# Patient Record
Sex: FEMALE | Race: OTHER | Employment: OTHER | URBAN - METROPOLITAN AREA
[De-identification: names, ages, dates, MRNs, and addresses within clinical notes are randomized per-mention and may not be internally consistent; named-entity substitution may affect disease eponyms.]

---

## 2018-04-02 ENCOUNTER — HOSPITAL ENCOUNTER (EMERGENCY)
Facility: HOSPITAL | Age: 48
Discharge: HOME | End: 2018-04-02
Attending: PHYSICIAN ASSISTANT
Payer: COMMERCIAL

## 2018-04-02 VITALS
OXYGEN SATURATION: 100 % | DIASTOLIC BLOOD PRESSURE: 70 MMHG | RESPIRATION RATE: 16 BRPM | BODY MASS INDEX: 33.96 KG/M2 | HEART RATE: 72 BPM | HEIGHT: 60 IN | SYSTOLIC BLOOD PRESSURE: 138 MMHG | TEMPERATURE: 98 F | WEIGHT: 173 LBS

## 2018-04-02 DIAGNOSIS — B02.9 HERPES ZOSTER WITHOUT COMPLICATION: Primary | ICD-10-CM

## 2018-04-02 DIAGNOSIS — N39.0 URINARY TRACT INFECTION WITHOUT HEMATURIA, SITE UNSPECIFIED: ICD-10-CM

## 2018-04-02 DIAGNOSIS — R10.13 ABDOMINAL PAIN, EPIGASTRIC: ICD-10-CM

## 2018-04-02 PROCEDURE — 36415 COLL VENOUS BLD VENIPUNCTURE: CPT

## 2018-04-02 PROCEDURE — 87086 URINE CULTURE/COLONY COUNT: CPT | Performed by: PHYSICIAN ASSISTANT

## 2018-04-02 PROCEDURE — 85025 COMPLETE CBC W/AUTO DIFF WBC: CPT | Performed by: PHYSICIAN ASSISTANT

## 2018-04-02 PROCEDURE — 81025 URINE PREGNANCY TEST: CPT

## 2018-04-02 PROCEDURE — 99283 EMERGENCY DEPT VISIT LOW MDM: CPT

## 2018-04-02 PROCEDURE — 80048 BASIC METABOLIC PNL TOTAL CA: CPT | Performed by: PHYSICIAN ASSISTANT

## 2018-04-02 PROCEDURE — 81001 URINALYSIS AUTO W/SCOPE: CPT | Performed by: PHYSICIAN ASSISTANT

## 2018-04-02 PROCEDURE — 83690 ASSAY OF LIPASE: CPT | Performed by: PHYSICIAN ASSISTANT

## 2018-04-02 PROCEDURE — 80076 HEPATIC FUNCTION PANEL: CPT | Performed by: PHYSICIAN ASSISTANT

## 2018-04-02 RX ORDER — DIPHENHYDRAMINE HCL 25 MG
25 CAPSULE ORAL EVERY 6 HOURS PRN
Qty: 20 CAPSULE | Refills: 0 | Status: SHIPPED | OUTPATIENT
Start: 2018-04-02 | End: 2018-04-07

## 2018-04-02 RX ORDER — DICYCLOMINE HCL 20 MG
20 TABLET ORAL 4 TIMES DAILY PRN
Qty: 30 TABLET | Refills: 0 | Status: SHIPPED | OUTPATIENT
Start: 2018-04-02 | End: 2018-05-02

## 2018-04-02 RX ORDER — FAMOTIDINE 20 MG/1
20 TABLET ORAL 2 TIMES DAILY
Qty: 28 TABLET | Refills: 0 | Status: SHIPPED | OUTPATIENT
Start: 2018-04-02 | End: 2018-04-16

## 2018-04-02 RX ORDER — VALACYCLOVIR HYDROCHLORIDE 1 G/1
1 TABLET, FILM COATED ORAL 3 TIMES DAILY
Qty: 21 TABLET | Refills: 0 | Status: SHIPPED | OUTPATIENT
Start: 2018-04-02 | End: 2018-04-09

## 2018-04-02 RX ORDER — CEPHALEXIN 500 MG/1
500 CAPSULE ORAL 3 TIMES DAILY
Qty: 21 CAPSULE | Refills: 0 | Status: SHIPPED | OUTPATIENT
Start: 2018-04-02 | End: 2018-04-09

## 2018-04-02 RX ORDER — DIAPER,BRIEF,INFANT-TODD,DISP
EACH MISCELLANEOUS 2 TIMES DAILY PRN
COMMUNITY

## 2018-04-02 RX ORDER — DIPHENHYDRAMINE HCL 25 MG
25 CAPSULE ORAL EVERY 6 HOURS PRN
Qty: 20 CAPSULE | Refills: 0 | Status: SHIPPED | OUTPATIENT
Start: 2018-04-02 | End: 2018-04-02

## 2018-04-02 NOTE — ED NOTES
Pt states congested nasally for ten days and having diarrhea x 10 days. Denies fevers. No distress. Lungs clear bilaterally. Denies cp.

## 2018-04-02 NOTE — ED NOTES
Pt with spot of small red raised rash to mid right chest since yesterday- states spot is itchy but so is \"my whole body. \" Pt also states epigastric pain since 0300 this morning. Denies v/d, + nausea.  Pt currently resting on ED cart on cell phone in no di

## 2018-04-03 NOTE — ED PROVIDER NOTES
Patient Seen in: [facility] AND [facility] Emergency Department    History   Patient presents with:  Rash Skin Problem (integumentary)  Allergic Rxn Allergies (immune)  Abdomen/Flank Pain (GI/)  Dizziness (neurologic)    Stated Complaint: Itching to body and b % Transdermal Gel,  Apply to affected area prn pain       No family history on file.     Smoking status: Never Smoker                                                              Smokeless tobacco: Never Used                          Review of Systems    Po tenderness. Genitourinary: Not examined. Lymphatic: No gross lymphadenopathy noted. Musculoskeletal: Musculoskeletal system is grossly intact. There is no obvious deformity. Neurological: Gross motor movement is intact in all 4 extremities.   Patient e without complication  (primary encounter diagnosis)  Abdominal pain, epigastric  Urinary tract infection without hematuria, site unspecified    Disposition:  Discharge    Follow-up:  Shannon Mckinnon [address]  [address] [place] [facility] [address] [phone]    Sc

## 2019-04-15 ENCOUNTER — OFFICE VISIT (OUTPATIENT)
Dept: SURGERY | Facility: CLINIC | Age: 49
End: 2019-04-15
Payer: COMMERCIAL

## 2019-04-15 VITALS
RESPIRATION RATE: 18 BRPM | SYSTOLIC BLOOD PRESSURE: 129 MMHG | WEIGHT: 199.81 LBS | OXYGEN SATURATION: 100 % | DIASTOLIC BLOOD PRESSURE: 76 MMHG | BODY MASS INDEX: 40.28 KG/M2 | HEIGHT: 59 IN | HEART RATE: 76 BPM

## 2019-04-15 DIAGNOSIS — R63.5 WEIGHT GAIN: ICD-10-CM

## 2019-04-15 DIAGNOSIS — E53.8 LOW SERUM VITAMIN B12: ICD-10-CM

## 2019-04-15 DIAGNOSIS — E66.01 MORBID OBESITY WITH BMI OF 40.0-44.9, ADULT (HCC): ICD-10-CM

## 2019-04-15 DIAGNOSIS — R73.09 ABNORMAL BLOOD SUGAR: ICD-10-CM

## 2019-04-15 DIAGNOSIS — E55.9 VITAMIN D DEFICIENCY: ICD-10-CM

## 2019-04-15 DIAGNOSIS — R53.82 CHRONIC FATIGUE: Primary | ICD-10-CM

## 2019-04-15 DIAGNOSIS — R06.83 SNORING: ICD-10-CM

## 2019-04-15 DIAGNOSIS — R06.00 DOE (DYSPNEA ON EXERTION): ICD-10-CM

## 2019-04-15 PROBLEM — E66.9 OBESITY (BMI 30-39.9): Status: ACTIVE | Noted: 2019-04-15

## 2019-04-15 PROBLEM — R06.09 DOE (DYSPNEA ON EXERTION): Status: ACTIVE | Noted: 2019-04-15

## 2019-04-15 PROCEDURE — 99204 OFFICE O/P NEW MOD 45 MIN: CPT | Performed by: INTERNAL MEDICINE

## 2019-04-15 NOTE — PROGRESS NOTES
[facility] Weight [facility] Consultation Note       Date of Consult:  4/15/2019    Patient:  Magaly Robles  :      1970  MRN:      [id number]    Referring Provider: Dr. Carlton ref.  provider found       Chief Complaint:  Patient presents with: Not on file        Inability: Not on file      Transportation needs:        Medical: Not on file        Non-medical: Not on file    Tobacco Use      Smoking status: Never Smoker      Smokeless tobacco: Never Used    Substance and Sexual Activity      Alcoh Drink 64oz of water per day, Maintain a daily food journal, No drinking 30 minutes before or after meals, Utilize portion control strategies to reduce calorie intake, Identify triggers for eating and manage cues and Eat slowly and take 20 to 30 minutes to activity-upper body exercises, walk 10 minutes per day. 4. Increase fruit and vegetable servings to 5-6 per day.       Needs to attend seminar    Needs support from family    Needs to cut back on sugar    Refer to RD      Diagnoses and all orders for this

## 2019-04-24 ENCOUNTER — TELEPHONE (OUTPATIENT)
Dept: SURGERY | Facility: CLINIC | Age: 49
End: 2019-04-24

## 2019-04-24 NOTE — TELEPHONE ENCOUNTER
4/19/19 @ 9:24am Spoke to Antonio at [facility], [phone], Ref#[id number]. She verified that patient has following benefits for Bariatric services:   • No weight management criteria. • No AGUSTINA/Blue Distinction required.    • KERI (BZQ#[id number]) DX E66.01 &

## 2019-04-24 NOTE — TELEPHONE ENCOUNTER
Per the request of Dr. Mary Seay, called and spoke to patient to review all insurance information received regarding Bariatric Program.

## 2019-06-17 ENCOUNTER — OFFICE VISIT (OUTPATIENT)
Dept: SURGERY | Facility: CLINIC | Age: 49
End: 2019-06-17
Payer: COMMERCIAL

## 2019-06-17 VITALS
BODY MASS INDEX: 38.71 KG/M2 | OXYGEN SATURATION: 99 % | WEIGHT: 192 LBS | SYSTOLIC BLOOD PRESSURE: 126 MMHG | HEART RATE: 73 BPM | DIASTOLIC BLOOD PRESSURE: 75 MMHG | HEIGHT: 59 IN | RESPIRATION RATE: 16 BRPM

## 2019-06-17 DIAGNOSIS — R53.82 CHRONIC FATIGUE: Primary | ICD-10-CM

## 2019-06-17 DIAGNOSIS — R06.83 SNORING: ICD-10-CM

## 2019-06-17 DIAGNOSIS — E55.9 VITAMIN D DEFICIENCY: ICD-10-CM

## 2019-06-17 DIAGNOSIS — E66.9 OBESITY (BMI 30-39.9): ICD-10-CM

## 2019-06-17 DIAGNOSIS — Z71.3 PRE-BARIATRIC SURGERY NUTRITION EVALUATION: ICD-10-CM

## 2019-06-17 DIAGNOSIS — R73.09 ABNORMAL BLOOD SUGAR: ICD-10-CM

## 2019-06-17 PROCEDURE — 99214 OFFICE O/P EST MOD 30 MIN: CPT | Performed by: INTERNAL MEDICINE

## 2019-06-17 RX ORDER — PHENTERMINE HYDROCHLORIDE 15 MG/1
15 CAPSULE ORAL EVERY MORNING
Qty: 30 CAPSULE | Refills: 2 | Status: SHIPPED | OUTPATIENT
Start: 2019-06-17 | End: 2021-05-10 | Stop reason: DRUGHIGH

## 2019-06-17 NOTE — PROGRESS NOTES
[address], [place] [address] Amy Zelaya  [facility] [address] [facility] [address]  Dept: [phone]       Patient:  Ed Lelo Sam  :      1970  MRN:      [id number]    Chief Complaint:  Patient pres Alcohol use: Not on file      Drug use: Not on file      Sexual activity: Not on file    Lifestyle      Physical activity:        Days per week: Not on file        Minutes per session: Not on file      Stress: Not on file    Relationships      Social conne minutes before or after meals, Utlize portion control strategies to reduce calorie intake, Identify triggers for eating and manage cues and Eat slowly and take 20 to 30 minutes to complete each meal    Exercise Goals Reviewed and Discussed    Keep walking beverages per day. No fruit juices or regular soda. 3. Increase activity-upper body exercises, walk 10 minutes per day. 4. Increase fruit and vegetable servings to 5-6 per day.       Has support from daughter    Needs to come to seminar    Aware she needs METABOLIC PANEL (14); Future  -     VITAMIN B1 (THIAMINE), BLOOD; Future  -     VITAMIN D, 25-HYDROXY; Future  -     VITAMIN B12; Future  -     MAGNESIUM;  Future  -     PHOSPHORUS; Future  -     IRON, SERUM; Future  -     FERRITIN; Future  -     LIPID PANE

## 2019-07-20 ENCOUNTER — LAB ENCOUNTER (OUTPATIENT)
Dept: LAB | Facility: HOSPITAL | Age: 49
End: 2019-07-20
Attending: INTERNAL MEDICINE
Payer: COMMERCIAL

## 2019-07-20 DIAGNOSIS — Z71.3 PRE-BARIATRIC SURGERY NUTRITION EVALUATION: ICD-10-CM

## 2019-07-20 DIAGNOSIS — R06.83 SNORING: ICD-10-CM

## 2019-07-20 DIAGNOSIS — E66.9 OBESITY (BMI 30-39.9): ICD-10-CM

## 2019-07-20 DIAGNOSIS — R63.5 WEIGHT GAIN: ICD-10-CM

## 2019-07-20 DIAGNOSIS — R73.09 ABNORMAL BLOOD SUGAR: ICD-10-CM

## 2019-07-20 DIAGNOSIS — E66.01 MORBID OBESITY WITH BMI OF 40.0-44.9, ADULT (HCC): ICD-10-CM

## 2019-07-20 DIAGNOSIS — E53.8 LOW SERUM VITAMIN B12: ICD-10-CM

## 2019-07-20 DIAGNOSIS — E55.9 VITAMIN D DEFICIENCY: ICD-10-CM

## 2019-07-20 DIAGNOSIS — R53.82 CHRONIC FATIGUE: ICD-10-CM

## 2019-07-20 DIAGNOSIS — R06.00 DOE (DYSPNEA ON EXERTION): ICD-10-CM

## 2019-07-20 LAB
ALBUMIN SERPL-MCNC: 3.7 G/DL (ref 3.4–5)
ALBUMIN/GLOB SERPL: 1 {RATIO} (ref 1–2)
ALP LIVER SERPL-CCNC: 70 U/L (ref 39–100)
ALT SERPL-CCNC: 21 U/L (ref 13–56)
ANION GAP SERPL CALC-SCNC: 5 MMOL/L (ref 0–18)
AST SERPL-CCNC: 16 U/L (ref 15–37)
BILIRUB SERPL-MCNC: 0.3 MG/DL (ref 0.1–2)
BUN BLD-MCNC: 11 MG/DL (ref 7–18)
BUN/CREAT SERPL: 15.3 (ref 10–20)
CALCIUM BLD-MCNC: 8.4 MG/DL (ref 8.5–10.1)
CHLORIDE SERPL-SCNC: 108 MMOL/L (ref 98–112)
CHOLEST SMN-MCNC: 133 MG/DL (ref ?–200)
CO2 SERPL-SCNC: 27 MMOL/L (ref 21–32)
CREAT BLD-MCNC: 0.72 MG/DL (ref 0.55–1.02)
DEPRECATED HBV CORE AB SER IA-ACNC: 7.2 NG/ML (ref 12–240)
DEPRECATED RDW RBC AUTO: 42.2 FL (ref 35.1–46.3)
ERYTHROCYTE [DISTWIDTH] IN BLOOD BY AUTOMATED COUNT: 13.1 % (ref 11–15)
EST. AVERAGE GLUCOSE BLD GHB EST-MCNC: 111 MG/DL (ref 68–126)
FOLATE SERPL-MCNC: 14.1 NG/ML (ref 8.7–?)
GLOBULIN PLAS-MCNC: 3.8 G/DL (ref 2.8–4.4)
GLUCOSE BLD-MCNC: 97 MG/DL (ref 70–99)
HAV IGM SER QL: 2.2 MG/DL (ref 1.6–2.6)
HBA1C MFR BLD HPLC: 5.5 % (ref ?–5.7)
HCT VFR BLD AUTO: 35.8 % (ref 35–48)
HDLC SERPL-MCNC: 47 MG/DL (ref 40–59)
HGB BLD-MCNC: 11.4 G/DL (ref 12–16)
IRON SERPL-MCNC: 48 UG/DL (ref 50–170)
LDLC SERPL CALC-MCNC: 71 MG/DL (ref ?–100)
M PROTEIN MFR SERPL ELPH: 7.5 G/DL (ref 6.4–8.2)
MCH RBC QN AUTO: 28 PG (ref 26–34)
MCHC RBC AUTO-ENTMCNC: 31.8 G/DL (ref 31–37)
MCV RBC AUTO: 88 FL (ref 80–100)
NONHDLC SERPL-MCNC: 86 MG/DL (ref ?–130)
OSMOLALITY SERPL CALC.SUM OF ELEC: 289 MOSM/KG (ref 275–295)
PATIENT FASTING: YES
PATIENT FASTING: YES
PHOSPHATE SERPL-MCNC: 3.4 MG/DL (ref 2.5–4.9)
PLATELET # BLD AUTO: 226 10(3)UL (ref 150–450)
POTASSIUM SERPL-SCNC: 4.4 MMOL/L (ref 3.5–5.1)
RBC # BLD AUTO: 4.07 X10(6)UL (ref 3.8–5.3)
SODIUM SERPL-SCNC: 140 MMOL/L (ref 136–145)
TRIGL SERPL-MCNC: 76 MG/DL (ref 30–149)
TSI SER-ACNC: 1.65 MIU/ML (ref 0.36–3.74)
VIT B12 SERPL-MCNC: 521 PG/ML (ref 193–986)
VLDLC SERPL CALC-MCNC: 15 MG/DL (ref 0–30)
WBC # BLD AUTO: 5.3 X10(3) UL (ref 4–11)

## 2019-07-20 PROCEDURE — 84443 ASSAY THYROID STIM HORMONE: CPT

## 2019-07-20 PROCEDURE — 82306 VITAMIN D 25 HYDROXY: CPT

## 2019-07-20 PROCEDURE — 83735 ASSAY OF MAGNESIUM: CPT

## 2019-07-20 PROCEDURE — 80053 COMPREHEN METABOLIC PANEL: CPT

## 2019-07-20 PROCEDURE — 80061 LIPID PANEL: CPT

## 2019-07-20 PROCEDURE — 82397 CHEMILUMINESCENT ASSAY: CPT

## 2019-07-20 PROCEDURE — 83036 HEMOGLOBIN GLYCOSYLATED A1C: CPT

## 2019-07-20 PROCEDURE — 82607 VITAMIN B-12: CPT

## 2019-07-20 PROCEDURE — 83540 ASSAY OF IRON: CPT

## 2019-07-20 PROCEDURE — 82728 ASSAY OF FERRITIN: CPT

## 2019-07-20 PROCEDURE — 82746 ASSAY OF FOLIC ACID SERUM: CPT

## 2019-07-20 PROCEDURE — 85027 COMPLETE CBC AUTOMATED: CPT

## 2019-07-20 PROCEDURE — 84425 ASSAY OF VITAMIN B-1: CPT

## 2019-07-20 PROCEDURE — 36415 COLL VENOUS BLD VENIPUNCTURE: CPT

## 2019-07-20 PROCEDURE — 84100 ASSAY OF PHOSPHORUS: CPT

## 2019-07-22 LAB
25(OH)D3 SERPL-MCNC: 16.4 NG/ML (ref 30–100)
LEPTIN: 28.2 NG/ML

## 2019-07-22 RX ORDER — ERGOCALCIFEROL (VITAMIN D2) 1250 MCG
50000 CAPSULE ORAL WEEKLY
Qty: 12 CAPSULE | Refills: 0 | Status: SHIPPED | OUTPATIENT
Start: 2019-07-22 | End: 2019-10-08

## 2019-07-24 LAB — VITAMIN B1 (THIAMINE), WHOLE B: 108 NMOL/L

## 2019-07-25 ENCOUNTER — TELEPHONE (OUTPATIENT)
Dept: SURGERY | Facility: CLINIC | Age: 49
End: 2019-07-25

## 2019-07-30 ENCOUNTER — TELEPHONE (OUTPATIENT)
Dept: SURGERY | Facility: CLINIC | Age: 49
End: 2019-07-30

## 2019-08-05 ENCOUNTER — TELEPHONE (OUTPATIENT)
Dept: SURGERY | Facility: CLINIC | Age: 49
End: 2019-08-05

## 2020-11-09 ENCOUNTER — OFFICE VISIT (OUTPATIENT)
Dept: FAMILY MEDICINE CLINIC | Facility: CLINIC | Age: 50
End: 2020-11-09
Payer: COMMERCIAL

## 2020-11-09 VITALS
BODY MASS INDEX: 37.69 KG/M2 | WEIGHT: 192 LBS | TEMPERATURE: 98 F | HEART RATE: 77 BPM | OXYGEN SATURATION: 100 % | DIASTOLIC BLOOD PRESSURE: 64 MMHG | SYSTOLIC BLOOD PRESSURE: 118 MMHG | HEIGHT: 60 IN

## 2020-11-09 DIAGNOSIS — Z20.822 EXPOSURE TO COVID-19 VIRUS: Primary | ICD-10-CM

## 2020-11-09 DIAGNOSIS — R05.9 COUGH: ICD-10-CM

## 2020-11-09 PROCEDURE — 99202 OFFICE O/P NEW SF 15 MIN: CPT | Performed by: NURSE PRACTITIONER

## 2020-11-09 PROCEDURE — 3008F BODY MASS INDEX DOCD: CPT | Performed by: NURSE PRACTITIONER

## 2020-11-09 PROCEDURE — 3078F DIAST BP <80 MM HG: CPT | Performed by: NURSE PRACTITIONER

## 2020-11-09 PROCEDURE — 99072 ADDL SUPL MATRL&STAF TM PHE: CPT | Performed by: NURSE PRACTITIONER

## 2020-11-09 PROCEDURE — 3074F SYST BP LT 130 MM HG: CPT | Performed by: NURSE PRACTITIONER

## 2020-11-09 NOTE — PATIENT INSTRUCTIONS
Enfermedad del coronavirus 2019 (COVID-19): ¿cómo cuidarse y cuidar a otros? Si usted o algún miembro del núcleo familiar tiene síntomas de la COVID-19, siga las siguientes pautas para prevenir la propagación del virus y para controlar los síntomas.    ¿ · Si tiene que ir a elroy clínica o a un hospital, tenga en cuenta que el personal de atención médica podría usar equipos de protección, john barbijos, trajes, guantes y protección ocular. Es posible que lo lleven a elroy chseter aparte.  Eso es para evitar que el · Evite el contacto con las mascotas y con otros [place]. · No comparta comida ni artículos personales con gente de perkins entorno. Eso incluye elementos john utensilios para comer y beber, toallas y louisaa de [place].   · Si necesita toser o estornudar, hágalo en Si se confirma que tiene la COVID-19, perkins equipo de atención médica puede pedirle que considere donar plasma. [place] se denomina donación de plasma de convaleciente de COVID-19.  El plasma de las personas recuperadas por completo de la COVID-19 puede contener Ashley restricciones son diferentes si tuvo COVID-19 en los últimos 3 meses, maverick está completamente recuperado, sin síntomas, y estuvo expuesto a elroy persona con COVID-19.  Si no presenta síntomas, no tiene que quedarse en casa asilado de otras personas ni vo Si tiene un sistema inmunitario debilitado y tiene COVID-19, o si tuvo un cuadro grave de COVID-19, las instrucciones sobre cuándo suspender el aislamiento serán algo diferentes.  Algunas afecciones y tratamientos pueden [employer], co · El uso de barbflakita puede ayudar a [employer] con COVID-19 propaguen el virus a otros.   · Mediante el uso de barbijos, hay elroy mayor probabilidad de reducir la propagación de la COVID-19 si la mayoría de las personas los utiliza en público.

## 2020-11-09 NOTE — PROGRESS NOTES
CHIEF COMPLAINT:   Patient presents with:  Covid: Symptoms x1 week      HPI:   Phillip Abad is a 52year old female who presents for cough for  1 week. Patient reports cough (mild). Symptoms have been improving since onset.   Treating symptoms with: GENERAL: Non-toxic, well developed, well nourished, and in no apparent distress  SKIN: no rashes, no suspicious lesions  HEAD: atraumatic, normocephalic. No tenderness on palpation of maxillary or frontal sinuses.   EYES: conjunctiva clear, EOM intact  EAR · Quédese en perkins casa. Llame a perkins proveedor de [employer] y dígale que tiene los síntomas de la COVID-19. Charly esto antes de ir al hospital o a la clínica. Siga las instrucciones de perkins proveedor. Es posible que le aconsejen que se aísle en perkins casa.  A e · Informe al personal de atención médica sobre delio viajes recientes. [place] incluye los viajes locales en transporte público. Henrene Damian el personal médico necesite averiguar acerca de otras personas con las que usted haya tenido contacto.   · [address] · Si necesita toser o estornudar, hágalo en un pañuelo desechable. Luego arroje el pañuelo desechable a la basura. Si no tiene un pañuelo, tosa o estornude en el pliegue del codo. · Lávese las fitz con frecuencia.     Cuidados personales en perkins casa   Actu Si se confirma que tiene la COVID-19, perkins equipo de atención médica puede pedirle que considere donar plasma. [place] se denomina donación de plasma de convaleciente de COVID-19.  El plasma de las personas recuperadas por completo de la COVID-19 puede contener Ashley restricciones son diferentes si tuvo COVID-19 en los últimos 3 meses, maverick está completamente recuperado, sin síntomas, y estuvo expuesto a elroy persona con COVID-19.  Si no presenta síntomas, no tiene que quedarse en casa asilado de otras personas ni vo Si tiene un sistema inmunitario debilitado y tiene COVID-19, o si tuvo un cuadro grave de COVID-19, las instrucciones sobre cuándo suspender el aislamiento serán algo diferentes.  Algunas afecciones y tratamientos pueden [employer], co · El uso de barbflakita puede ayudar a [employer] con COVID-19 propaguen el virus a otros.   · Mediante el uso de barbijos, hay elroy mayor probabilidad de reducir la propagación de la COVID-19 si la mayoría de las personas los utiliza en público.

## 2021-05-10 ENCOUNTER — EKG ENCOUNTER (OUTPATIENT)
Dept: LAB | Facility: HOSPITAL | Age: 51
End: 2021-05-10
Attending: INTERNAL MEDICINE
Payer: COMMERCIAL

## 2021-05-10 ENCOUNTER — OFFICE VISIT (OUTPATIENT)
Dept: SURGERY | Facility: CLINIC | Age: 51
End: 2021-05-10
Payer: COMMERCIAL

## 2021-05-10 VITALS
SYSTOLIC BLOOD PRESSURE: 142 MMHG | OXYGEN SATURATION: 100 % | DIASTOLIC BLOOD PRESSURE: 80 MMHG | HEART RATE: 68 BPM | BODY MASS INDEX: 39.72 KG/M2 | WEIGHT: 197 LBS | HEIGHT: 59 IN

## 2021-05-10 DIAGNOSIS — E66.9 OBESITY (BMI 30-39.9): ICD-10-CM

## 2021-05-10 DIAGNOSIS — R53.82 CHRONIC FATIGUE: Primary | ICD-10-CM

## 2021-05-10 DIAGNOSIS — Z51.81 ENCOUNTER FOR THERAPEUTIC DRUG MONITORING: ICD-10-CM

## 2021-05-10 DIAGNOSIS — R53.82 CHRONIC FATIGUE: ICD-10-CM

## 2021-05-10 PROCEDURE — 93010 ELECTROCARDIOGRAM REPORT: CPT | Performed by: INTERNAL MEDICINE

## 2021-05-10 PROCEDURE — 3079F DIAST BP 80-89 MM HG: CPT | Performed by: INTERNAL MEDICINE

## 2021-05-10 PROCEDURE — 93005 ELECTROCARDIOGRAM TRACING: CPT

## 2021-05-10 PROCEDURE — 3008F BODY MASS INDEX DOCD: CPT | Performed by: INTERNAL MEDICINE

## 2021-05-10 PROCEDURE — 99214 OFFICE O/P EST MOD 30 MIN: CPT | Performed by: INTERNAL MEDICINE

## 2021-05-10 PROCEDURE — 3077F SYST BP >= 140 MM HG: CPT | Performed by: INTERNAL MEDICINE

## 2021-05-10 RX ORDER — PHENTERMINE HYDROCHLORIDE 15 MG/1
15 CAPSULE ORAL EVERY MORNING
Qty: 30 CAPSULE | Refills: 2 | Status: SHIPPED | OUTPATIENT
Start: 2021-05-10 | End: 2022-02-01

## 2021-05-10 NOTE — PROGRESS NOTES
[address] [facility] [address]  Dept: [phone]       Patient:  Susi Kjdemetrio Vargas  :      1970  MRN:      [id number]    Chief Complaint:  Patient pres Substance and Sexual Activity      Alcohol use: Not on file      Drug use: Not on file      Sexual activity: Not on file    Other Topics      Concerns:        Not on file    Social History Narrative      Not on file    Social Determinants of [address] [facility] within 1 hour of waking  and Eat 3-4 cups of fresh fruits or vegetables daily    Behavior Modifications Reviewed and Discussed  Plan meals in advance, Read nutrition labels, Drink 64 oz of water per day, Maintain a daily food journal, No drinking 30 minute consideration for obtaining the sleep study will be discussed with the patient's PCP. Goals for next month:  1. Keep a food log. 2. Drink 48-64 ounces of non-caloric beverages per day. No fruit juices or regular soda.   3. Increase activity-upper body e

## 2021-05-11 ENCOUNTER — HOSPITAL ENCOUNTER (OUTPATIENT)
Dept: MAMMOGRAPHY | Facility: HOSPITAL | Age: 51
Discharge: HOME | End: 2021-05-11
Attending: FAMILY MEDICINE
Payer: COMMERCIAL

## 2021-05-11 DIAGNOSIS — Z12.31 ENCOUNTER FOR SCREENING MAMMOGRAM FOR MALIGNANT NEOPLASM OF BREAST: ICD-10-CM

## 2021-05-11 PROCEDURE — 77067 SCR MAMMO BI INCL CAD: CPT | Performed by: FAMILY MEDICINE

## 2021-05-11 PROCEDURE — 77063 BREAST TOMOSYNTHESIS BI: CPT | Performed by: FAMILY MEDICINE

## 2022-02-01 ENCOUNTER — OFFICE VISIT (OUTPATIENT)
Dept: SURGERY | Facility: CLINIC | Age: 52
End: 2022-02-01
Payer: COMMERCIAL

## 2022-02-01 VITALS
WEIGHT: 212.63 LBS | HEART RATE: 76 BPM | BODY MASS INDEX: 42.87 KG/M2 | SYSTOLIC BLOOD PRESSURE: 131 MMHG | DIASTOLIC BLOOD PRESSURE: 75 MMHG | HEIGHT: 59 IN | OXYGEN SATURATION: 100 %

## 2022-02-01 DIAGNOSIS — Z51.81 ENCOUNTER FOR THERAPEUTIC DRUG MONITORING: ICD-10-CM

## 2022-02-01 DIAGNOSIS — E66.01 MORBID OBESITY WITH BMI OF 40.0-44.9, ADULT (HCC): ICD-10-CM

## 2022-02-01 DIAGNOSIS — R53.82 CHRONIC FATIGUE: Primary | ICD-10-CM

## 2022-02-01 PROCEDURE — 3078F DIAST BP <80 MM HG: CPT | Performed by: INTERNAL MEDICINE

## 2022-02-01 PROCEDURE — 99214 OFFICE O/P EST MOD 30 MIN: CPT | Performed by: INTERNAL MEDICINE

## 2022-02-01 PROCEDURE — 3075F SYST BP GE 130 - 139MM HG: CPT | Performed by: INTERNAL MEDICINE

## 2022-02-01 PROCEDURE — 3008F BODY MASS INDEX DOCD: CPT | Performed by: INTERNAL MEDICINE

## 2022-02-15 ENCOUNTER — OFFICE VISIT (OUTPATIENT)
Dept: SURGERY | Facility: CLINIC | Age: 52
End: 2022-02-15
Payer: COMMERCIAL

## 2022-02-15 VITALS — BODY MASS INDEX: 38.83 KG/M2 | HEIGHT: 62 IN | WEIGHT: 211 LBS

## 2022-02-15 DIAGNOSIS — G47.33 OSA (OBSTRUCTIVE SLEEP APNEA): Primary | ICD-10-CM

## 2022-02-15 DIAGNOSIS — E66.01 CLASS 2 SEVERE OBESITY WITH SERIOUS COMORBIDITY AND BODY MASS INDEX (BMI) OF 38.0 TO 38.9 IN ADULT, UNSPECIFIED OBESITY TYPE (HCC): ICD-10-CM

## 2022-02-15 PROCEDURE — 3008F BODY MASS INDEX DOCD: CPT

## 2022-02-15 PROCEDURE — 97802 MEDICAL NUTRITION INDIV IN: CPT

## 2023-11-06 ENCOUNTER — APPOINTMENT (OUTPATIENT)
Dept: GENERAL RADIOLOGY | Facility: HOSPITAL | Age: 53
End: 2023-11-06
Attending: EMERGENCY MEDICINE
Payer: COMMERCIAL

## 2023-11-06 ENCOUNTER — HOSPITAL ENCOUNTER (EMERGENCY)
Facility: HOSPITAL | Age: 53
Discharge: HOME | End: 2023-11-06
Attending: EMERGENCY MEDICINE
Payer: COMMERCIAL

## 2023-11-06 VITALS
WEIGHT: 223 LBS | BODY MASS INDEX: 41 KG/M2 | SYSTOLIC BLOOD PRESSURE: 126 MMHG | HEART RATE: 66 BPM | OXYGEN SATURATION: 98 % | TEMPERATURE: 98 F | DIASTOLIC BLOOD PRESSURE: 62 MMHG | RESPIRATION RATE: 19 BRPM

## 2023-11-06 DIAGNOSIS — M21.42 FLAT FOOT (PES PLANUS) (ACQUIRED), LEFT FOOT: Primary | ICD-10-CM

## 2023-11-06 PROCEDURE — 73630 X-RAY EXAM OF FOOT: CPT | Performed by: EMERGENCY MEDICINE

## 2023-11-06 PROCEDURE — 99283 EMERGENCY DEPT VISIT LOW MDM: CPT

## 2023-11-06 RX ORDER — TRAMADOL HYDROCHLORIDE 50 MG/1
TABLET ORAL EVERY 6 HOURS PRN
Qty: 10 TABLET | Refills: 0 | Status: SHIPPED | OUTPATIENT
Start: 2023-11-06 | End: 2023-11-11

## 2023-11-06 RX ORDER — TRAMADOL HYDROCHLORIDE 50 MG/1
50 TABLET ORAL ONCE
Status: COMPLETED | OUTPATIENT
Start: 2023-11-06 | End: 2023-11-06

## 2023-11-06 NOTE — ED INITIAL ASSESSMENT (HPI)
Patient ambulatory to ED with complaint of pain in left foot. Pt states she has bad nerves in that extremity. Difficult to ambulate. Patient is AXOX4.

## 2024-03-06 RX ORDER — ACETAMINOPHEN 500 MG
500 TABLET ORAL EVERY 6 HOURS PRN
COMMUNITY
End: 2024-03-13

## 2024-03-06 NOTE — PAT NURSING NOTE
Phone interviewed done for upcoming surgery on 3/13/24. Per patient she is no longer taking Phentermine. States it has been months since she last took it.

## 2024-03-11 NOTE — DISCHARGE INSTRUCTIONS
Dr. Mckinney Instructions:   Keep dressing clean, dry, intact.   Elevate your leg 45 degrees for 45 minutes every hour   Ice behind your knee 15 minutes every hour   Non weight bearing to your left foot for 2 weeks. Use crutches or knee scooter to keep weight off.   Post op pain medication: Over the counter Advil 800 mg every 12 hours with food, Tylneol 500 mg extra stength tablets take 2 tablets every 12 hours, Norco 5/325 mg take 1 tablet every 4 hours for post op pain worse than 7/10.   Do not get dressing wet. Avoid showering or shower with cast bag over left foot dressing. If dressing gets wet, call my office immediately for a dressing change to avoid infection.   Call Dr. Mckinney if you experience fever, vomiting, nausea, chills, shortness of breath   Follow up in two weeks for first post op visit. Dr. Mckinney's assistant will call and schedule appointment.      HOME INSTRUCTIONS  [facility] HOME CARE INSTRUCTIONS: POST-OP ANESTHESIA  The medication that you received for sedation or general anesthesia can last up to 24 hours. Your judgment and reflexes may be altered, even if you feel like your normal self.      We Recommend:   Do not drive any motor vehicle or bicycle   Avoid mowing the lawn, playing sports, or working with power tools/applicances (power saws, electric knives or mixers)   That you have someone stay with you on your first night home   Do not drink alcohol or take sleeping pills or tranquilizers   Do not sign legal documents within 24 hours of your procedure   If you had a nerve block for your surgery, take extra care not to put any pressure on your arm or hand for 24 hours    It is normal:  For you to have a sore throat if you had a breathing tube during surgery (while you were asleep!). The sore throat should get better within 48 hours. You can gargle with warm salt water (1/2 tsp in 4 oz warm water) or use a throat lozenge for comfort  To feel muscle aches or soreness especially in the abdomen,  chest or neck. The achy feeling should go away in the next 24 hours  To feel weak, sleepy or \"wiped out\". Your should start feeling better in the next 24 hours.   To experience mild discomforts such as sore lip or tongue, headache, cramps, gas pains or a bloated feeling in your abdomen.   To experience mild back pain or soreness for a day or two if you had spinal or epidural anesthesia.   If you had laparoscopic surgery, to feel shoulder pain or discomfort on the day of surgery.   For some patients to have nausea after surgery/anesthesia    If you feel nausea or experience vomiting:   Try to move around less.   Eat less than usual or drink only liquids until the next morning   Nausea should resolve in about 24 hours    If you have a problem when you are at home:    Call your surgeons office     Discharge Instructions: After Your Surgery  You’ve just had surgery. During surgery, you were given medicine called anesthesia to keep you relaxed and free of pain. After surgery, you may have some pain or nausea. This is common. Here are some tips for feeling better and getting well after surgery.   Going home  Your healthcare provider will show you how to take care of yourself when you go home. They'll also answer your questions. Have an adult family member or friend drive you home. For the first 24 hours after your surgery:   Don't drive or use heavy equipment.  Don't make important decisions or sign legal papers.  Take medicines as directed.  Don't drink alcohol.  Have someone stay with you, if needed. They can watch for problems and help keep you safe.  Be sure to go to all follow-up visits with your healthcare provider. And rest after your surgery for as long as your provider tells you to.   Coping with pain  If you have pain after surgery, pain medicine will help you feel better. Take it as directed, before pain becomes severe. Also, ask your healthcare provider or pharmacist about other ways to control pain. This  might be with heat, ice, or relaxation. And follow any other instructions your surgeon or nurse gives you.      Stay on schedule with your medicine.     Tips for taking pain medicine  To get the best relief possible, remember these points:   Pain medicines can upset your stomach. Taking them with a little food may help.  Most pain relievers taken by mouth need at least 20 to 30 minutes to start to work.  Don't wait till your pain becomes severe before you take your medicine. Try to time your medicine so that you can take it before starting an activity. This might be before you get dressed, go for a walk, or sit down for dinner.  Constipation is a common side effect of some pain medicines. Call your healthcare provider before taking any medicines such as laxatives or stool softeners to help ease constipation. Also ask if you should skip any foods. Drinking lots of fluids and eating foods such as fruits and vegetables that are high in fiber can also help. Remember, don't take laxatives unless your surgeon has prescribed them.  Drinking alcohol and taking pain medicine can cause dizziness and slow your breathing. It can even be deadly. Don't drink alcohol while taking pain medicine.  Pain medicine can make you react more slowly to things. Don't drive or run machinery while taking pain medicine.  Your healthcare provider may tell you to take acetaminophen to help ease your pain. Ask them how much you're supposed to take each day. Acetaminophen or other pain relievers may interact with your prescription medicines or other over-the-counter (OTC) medicines. Some prescription medicines have acetaminophen and other ingredients in them. Using both prescription and OTC acetaminophen for pain can cause you to accidentally overdose. Read the labels on your OTC medicines with care. This will help you to clearly know the list of ingredients, how much to take, and any warnings. It may also help you not take too much acetaminophen.  If you have questions or don't understand the information, ask your pharmacist or healthcare provider to explain it to you before you take the OTC medicine.   Managing nausea  Some people have an upset stomach (nausea) after surgery. This is often because of anesthesia, pain, or pain medicine, less movement of food in the stomach, or the stress of surgery. These tips will help you handle nausea and eat healthy foods as you get better. If you were on a special food plan before surgery, ask your healthcare provider if you should follow it while you get better. Check with your provider on how your eating should progress. It may depend on the surgery you had. These general tips may help:   Don't push yourself to eat. Your body will tell you when to eat and how much.  Start off with clear liquids and soup. They're easier to digest.  Next try semi-solid foods as you feel ready. These include mashed potatoes, applesauce, and gelatin.  Slowly move to solid foods. Don’t eat fatty, rich, or spicy foods at first.  Don't force yourself to have 3 large meals a day. Instead eat smaller amounts more often.  Take pain medicines with a small amount of solid food, such as crackers or toast. This helps prevent nausea.  When to call your healthcare provider  Call your healthcare provider right away if you have any of these:   You still have too much pain, or the pain gets worse, after taking the medicine. The medicine may not be strong enough. Or there may be a complication from the surgery.  You feel too sleepy, dizzy, or groggy. The medicine may be too strong.  Side effects such as nausea or vomiting. Your healthcare provider may advise taking other medicines to .  Skin changes such as rash, itching, or hives. This may mean you have an allergic reaction. Your provider may advise taking other medicines.  The incision looks different (for instance, part of it opens up).  Bleeding or fluid leaking from the incision site, and weren't  told to expect that.  Fever of 100.4°F (38°C) or higher, or as directed by your provider.  Call 911  Call 911 right away if you have:   Trouble breathing  Facial swelling    If you have obstructive sleep apnea   You were given anesthesia medicine during surgery to keep you comfortable and free of pain. After surgery, you may have more apnea spells because of this medicine and other medicines you were given. The spells may last longer than normal.    At home:  Keep using the continuous positive airway pressure (CPAP) device when you sleep. Unless your healthcare provider tells you not to, use it when you sleep, day or night. CPAP is a common device used to treat obstructive sleep apnea.  Talk with your provider before taking any pain medicine, muscle relaxants, or sedatives. Your provider will tell you about the possible dangers of taking these medicines.  Contact your provider if your sleeping changes a lot even when taking medicines as directed.  [employer] last reviewed this educational content on 10/1/2021  © 0003-9299 [employer]. All rights reserved. This information is not intended as a substitute for professional medical care. Always follow your healthcare professional's instructions.                 CIRUGIA AMBULATORIA: INSTRUCCIONES DESPUES DE BRII RECIBIDO ANESTESIA  Debido a la Anestesia y a las medicamentos que se le aplicaron tata la cirugia, delio reflejos y capacidaddiscernimiento pueden verse afectados. Tambien podria tener un poco de mareo.Aparte de siguir las precauciones de sentico comun, le recomendamos lo siguiente:     El paciente debe estar acompañado por alguien hasta la mañana siguiente.     No maneje ningun vehiculo automotor ni monte bicicleta.     No tome ninguna decision importante john por ejemplo firmar de documentos importantes.     No opere herramientas electricas ni electrodomesticos, tales john cuchillos electricos, batidoras electricas o serruchos electricos. No  rambo el cesped con cortadoras electricas. No practique deportes.     No michele ejercicio.     Para evitar las nauseas, coma menos de lo normal mas o menos la mitad de lo habitual y/o kailey solo liquidos hasta la manana siguiente. Consulte con perkins doctor si esta llevando elroy dieta especial.     No tome bebidas alcoholicas, tranquilizantes, pastilles para dormir etc., y verifique con perkins doctor acerca de cualquier medicamento que zbigniew tomando actualmente.     El efecto de la medicación usada en perkins anestesia habra pasado seamus por completo a la medianoche. Por lo tanto, puede reanudar delio habitos cotidianos en la mañana.     Los adultos deben descansar lo josé luis posible por las siguientes 24 horas. Los niños deben permanecer en cama lo josé luis posible por las siguientes 24 horas.     Si se presenta cualquier problema, puede llamar a perkins propio doctor personal o aceda al centro de [facility].    Si sigue estas instrucciones, se sentira major y estara mas seguro despues de perkins cirugia ambulatoria. Sitiene cualquier pregunta, llame al hospital y pida que lo comuniquen con la enfermera de cirugia [phone], extension [phone].    Instrucciones para el joseph: después de perkins cirugía   Acaba de someterse a elroy cirugía. [place]lick la cirugía, le administraron un tipo de medicamento llamado anestesia para que esté relajado y no sienta dolor. Después de la cirugía, scarlett vez sienta algo de dolor o náuseas. [address] es común. Estos son algunos consejos para sentirse mejor y recuperarse rosalie después de la cirugía.   El regreso a casa  Perkins proveedor de atención médica le enseñará cómo cuidarse cuando regrese a perkins casa. También responderá delio preguntas. Pida a un familiar o amigo adulto que lo conduzca a perkins casa. Slick las primeras 24 horas después de la cirugía, siga estas recomendaciones:   No conduzca ni use maquinaria pesada.  No tome decisiones importantes ni firme ningún documento legal.  Adminístrese  los medicamentos según las indicaciones.  Evite el consumo de alcohol.  Si es necesario, coordine para que alguien se quede con usted. Esta persona puede vigilar cualquier problema que se presente y lo ayudará a permanecer seguro.  Asegúrese de asistir a todas delio visitas de control con perkins proveedor de atención médica. Y descanse después de la cirugía tata el tiempo que le indique perkins proveedor.   Cómo sobrellevar el dolor  Si siente dolor después de la cirugía, los analgésicos lo ayudarán a sentirse mejor. [place] los analgésicos según las indicaciones, antes de que el dolor se intensifique. Además, pregunte a perkins proveedor de atención médica o al farmacéutico acerca de otras formas de controlar el dolor. Estas podrían incluir aplicar calor o hielo, o hacer ejercicios de relajación. Y siga todas las instrucciones que le dé perkins cirujano o enfermero.      Cumpla el cronograma de delio medicamentos.     Consejos para tobias analgésicos  Para aliviar el dolor lo cristian posible, recuerde estos puntos:   Los analgésicos pueden causar malestar estomacal. Tomarlos con un poco de comida puede aliviar jyoti efecto.  La mayoría de los calmantes que se herminio por la boca necesitan por lo menos de 20 a 30 minutos para surtir efecto.  No espere hasta que perkins dolor se vuelva intenso para tobias el analgésico que le indicaron. Intente que el momento en que puede tobias perkins medicamento coincida con otra actividad. Jyoti podría ser el momento antes de vestirse, yvonne un paseo o sentarse a la levy para cenar.  El estreñimiento es un efecto secundario frecuente de algunos analgésicos. Consulte a perkins proveedor de atención médica antes de usar cualquier medicamento, john laxantes o ablandadores de heces, para ayudar a aliviar el estreñimiento. También consulte si es preciso evitar algún tipo de alimento. Tobias mucha cantidad de líquido y comer alimentos john frutas y verduras con alto contenido de fibra también puede ser beneficioso. Recuerde que no  debe katarina laxantes a menos que perkins cirujano se los indique.  Mezclar bebidas alcohólicas y analgésicos puede causar mareos y enlentecer perkins respiración. Y hasta puede ser mortal. No kailey alcohol mientras esté tomando calmantes.  Los analgésicos pueden hacer que tenga reacciones más lentas. No conduzca ni opere maquinaria mientras esté tomando analgésicos.  Perkins proveedor de atención médica puede indicarle que tome acetaminofén (paracetamol) para ayudar a aliviar el dolor. Pregúntele qué cantidad debe katarina por día. El acetaminofén y otros analgésicos pueden interactuar con delio medicamentos recetados u otros medicamentos de venta javid (OTC, por delio siglas en inglés). Algunos medicamentos recetados contienen acetaminofén y otros ingredientes. Combinar medicamentos recetados y acetaminofén de venta javid para aliviar el dolor puede provocarle elroy sobredosis accidental. Keerthi atentamente la etiqueta del envase de delio medicamentos OTC. [place] lo ayudará a saber con exactitud la lista de ingredientes, la cantidad que debe katarina y cualquier advertencia. [place] también puede ayudarlo a evitar katarina demasiado acetaminofén. Si tiene preguntas o no entiende la información, pídale a perkins farmacéutico o proveedor de atención médica que se la explique antes de katarina el medicamento OTC.   Manejo de las náuseas  Algunas personas pueden sentir malestar estomacal (náuseas) después de la cirugía. [place] suele suceder debido a la anestesia, el dolor, los analgésicos, la disminución del movimiento de la comida en el estómago o el estrés de la cirugía. Estos consejos lo ayudarán a manejar las náuseas y a comer alimentos más saludables mientras se recupera. Si seguía un plan alimentario especial antes de la cirugía, pregúntele a perkins proveedor de atención médica si debe continuarlo mientras se recupera. Consulte con perkins proveedor cómo debería continuar perkins alimentación. Esta puede variar según el tipo de cirugía a la que se sometió. Los siguientes  consejos generales pueden serle útiles:   No se fuerce a comer. Gu[place]ese por perkins cuerpo para saber cuándo comer y qué cantidad.  Comience con líquidos transparentes y sopa. Estos son más fáciles de digerir.  Tan pronto john se sienta listo, intente comer alimentos semisólidos. Estos incluyen puré de brandon, puré de manzana y gelatina.  Lentamente, pase a alimentos sólidos. Al principio no coma alimentos grasosos, pesados ni condimentados.  No se fuerce a hacer [place] comidas grandes al día. En cambio, coma cantidades pequeñas, maverick con mayor frecuencia.  [place] los analgésicos con elroy pequeña cantidad de alimentos sólidos, john galletas saladas o elroy tostada. [place] ayuda a prevenir las náuseas.  Cuándo llamar a perkins proveedor de atención médica   Llame de inmediato a perkins proveedor de atención médica si nota alguno de los siguientes síntomas:   Sigue teniendo mucho dolor, o el dolor empeora, después de katarina el medicamento. Puede que el medicamento no sea lo suficientemente sean. O rosalie, puede arnulfo complicaciones de la cirugía.  Se siente demasiado somnoliento, mareado o adormecido. Quizás el medicamento sea demasiado sean.  Tiene efectos secundarios, john náuseas o vómitos. [place] proveedor de atención médica puede recomendarle katarina otros medicamentos.  Tiene cambios en la piel, john sarpullido, picazón o urticaria. [place] puede significar que tiene elroy reacción alérgica. [place] proveedor puede recomendarle katarina otros medicamentos.  La incisión tiene un aspecto diferente (por ejemplo, se abre elroy parte).  Tiene sangrado o supuración de líquido de la herida y no le dijeron que eso era esperable.  Fiebre de 100.4 °F (38 °C) o más, o según le indique perkins proveedor.  Cuándo llamar al 911  Llame al  911  de inmediato si tiene:   Dificultad para respirar  [place] hinchada    Si tiene apnea del sueño obstructiva   [place] la cirugía, le administraron anestesia para que esté cómodo y no sienta dolor. Después de la cirugía, es probable que  tenga más ataques de apnea causados por la anestesia y otros medicamentos que le administraron. Los ataques pueden durar más de lo habitual.    En perkins casa, michele lo siguiente:  Cuando duerma, siga usando perkins dispositivo de presión positiva continua en las vías respiratorias (CPAP, por delio siglas en inglés). A menos que perkins proveedor de atención médica le indique lo contrario, úselo siempre que duerma, ya sea de día o de noche. El dispositivo de CPAP suele usarse para tratar la apnea obstructiva del sueño.  Consulte a perkins proveedor antes de katarina cualquier analgésico, relajante muscular o sedante. [place] proveedor le dará información sobre los peligros de katarina estos medicamentos.  Comuníquese con perkins proveedor si tiene el sueño demasiado alterado, incluso cuando esté tomando los medicamentos según las instrucciones.  © 5842-0915 [employer]. Todos los derechos reservados. Esta información no pretende sustituir la atención médica profesional. Sólo perkins médico puede diagnosticar y tratar un problema de nickolas.

## 2024-03-13 ENCOUNTER — ANESTHESIA (OUTPATIENT)
Dept: SURGERY | Facility: HOSPITAL | Age: 54
End: 2024-03-13
Payer: COMMERCIAL

## 2024-03-13 ENCOUNTER — HOSPITAL ENCOUNTER (OUTPATIENT)
Facility: HOSPITAL | Age: 54
Setting detail: HOSPITAL OUTPATIENT SURGERY
Discharge: HOME | End: 2024-03-13
Attending: STUDENT IN AN ORGANIZED HEALTH CARE EDUCATION/TRAINING PROGRAM | Admitting: STUDENT IN AN ORGANIZED HEALTH CARE EDUCATION/TRAINING PROGRAM
Payer: COMMERCIAL

## 2024-03-13 ENCOUNTER — ANESTHESIA EVENT (OUTPATIENT)
Dept: SURGERY | Facility: HOSPITAL | Age: 54
End: 2024-03-13
Payer: COMMERCIAL

## 2024-03-13 VITALS
WEIGHT: 224 LBS | HEIGHT: 60 IN | SYSTOLIC BLOOD PRESSURE: 118 MMHG | BODY MASS INDEX: 43.98 KG/M2 | HEART RATE: 94 BPM | DIASTOLIC BLOOD PRESSURE: 56 MMHG | OXYGEN SATURATION: 96 % | TEMPERATURE: 99 F | RESPIRATION RATE: 13 BRPM

## 2024-03-13 DIAGNOSIS — Z98.890 S/P FOOT SURGERY, LEFT: Primary | ICD-10-CM

## 2024-03-13 LAB — B-HCG UR QL: NEGATIVE

## 2024-03-13 PROCEDURE — 81025 URINE PREGNANCY TEST: CPT

## 2024-03-13 PROCEDURE — 76942 ECHO GUIDE FOR BIOPSY: CPT | Performed by: STUDENT IN AN ORGANIZED HEALTH CARE EDUCATION/TRAINING PROGRAM

## 2024-03-13 PROCEDURE — 01NG0ZZ RELEASE TIBIAL NERVE, OPEN APPROACH: ICD-10-PCS | Performed by: STUDENT IN AN ORGANIZED HEALTH CARE EDUCATION/TRAINING PROGRAM

## 2024-03-13 PROCEDURE — 64450 NJX AA&/STRD OTHER PN/BRANCH: CPT | Performed by: STUDENT IN AN ORGANIZED HEALTH CARE EDUCATION/TRAINING PROGRAM

## 2024-03-13 RX ORDER — LIDOCAINE HYDROCHLORIDE 10 MG/ML
INJECTION, SOLUTION EPIDURAL; INFILTRATION; INTRACAUDAL; PERINEURAL AS NEEDED
Status: DISCONTINUED | OUTPATIENT
Start: 2024-03-13 | End: 2024-03-13 | Stop reason: SURG

## 2024-03-13 RX ORDER — METOCLOPRAMIDE HYDROCHLORIDE 5 MG/ML
10 INJECTION INTRAMUSCULAR; INTRAVENOUS ONCE
Status: COMPLETED | OUTPATIENT
Start: 2024-03-13 | End: 2024-03-13

## 2024-03-13 RX ORDER — ROPIVACAINE HYDROCHLORIDE 5 MG/ML
INJECTION, SOLUTION EPIDURAL; INFILTRATION; PERINEURAL
Status: DISCONTINUED | OUTPATIENT
Start: 2024-03-13 | End: 2024-03-13 | Stop reason: SURG

## 2024-03-13 RX ORDER — MORPHINE SULFATE 10 MG/ML
6 INJECTION, SOLUTION INTRAMUSCULAR; INTRAVENOUS EVERY 10 MIN PRN
Status: DISCONTINUED | OUTPATIENT
Start: 2024-03-13 | End: 2024-03-13

## 2024-03-13 RX ORDER — ACETAMINOPHEN 500 MG
1000 TABLET ORAL ONCE
Status: COMPLETED | OUTPATIENT
Start: 2024-03-13 | End: 2024-03-13

## 2024-03-13 RX ORDER — LIDOCAINE HYDROCHLORIDE 10 MG/ML
INJECTION, SOLUTION INFILTRATION; PERINEURAL
Status: DISCONTINUED | OUTPATIENT
Start: 2024-03-13 | End: 2024-03-13 | Stop reason: SURG

## 2024-03-13 RX ORDER — NALOXONE HYDROCHLORIDE 0.4 MG/ML
0.08 INJECTION, SOLUTION INTRAMUSCULAR; INTRAVENOUS; SUBCUTANEOUS AS NEEDED
Status: DISCONTINUED | OUTPATIENT
Start: 2024-03-13 | End: 2024-03-13

## 2024-03-13 RX ORDER — MORPHINE SULFATE 4 MG/ML
4 INJECTION, SOLUTION INTRAMUSCULAR; INTRAVENOUS EVERY 10 MIN PRN
Status: DISCONTINUED | OUTPATIENT
Start: 2024-03-13 | End: 2024-03-13

## 2024-03-13 RX ORDER — HYDROMORPHONE HYDROCHLORIDE 1 MG/ML
0.2 INJECTION, SOLUTION INTRAMUSCULAR; INTRAVENOUS; SUBCUTANEOUS EVERY 5 MIN PRN
Status: DISCONTINUED | OUTPATIENT
Start: 2024-03-13 | End: 2024-03-13

## 2024-03-13 RX ORDER — ACETAMINOPHEN 500 MG
1000 TABLET ORAL 2 TIMES DAILY
Qty: 90 TABLET | Refills: 1 | Status: SHIPPED | OUTPATIENT
Start: 2024-03-13

## 2024-03-13 RX ORDER — HYDROCODONE BITARTRATE AND ACETAMINOPHEN 5; 325 MG/1; MG/1
1 TABLET ORAL EVERY 4 HOURS PRN
Qty: 30 TABLET | Refills: 0 | Status: SHIPPED | OUTPATIENT
Start: 2024-03-13

## 2024-03-13 RX ORDER — FAMOTIDINE 10 MG/ML
20 INJECTION, SOLUTION INTRAVENOUS ONCE
Status: COMPLETED | OUTPATIENT
Start: 2024-03-13 | End: 2024-03-13

## 2024-03-13 RX ORDER — HYDROMORPHONE HYDROCHLORIDE 1 MG/ML
0.4 INJECTION, SOLUTION INTRAMUSCULAR; INTRAVENOUS; SUBCUTANEOUS EVERY 5 MIN PRN
Status: DISCONTINUED | OUTPATIENT
Start: 2024-03-13 | End: 2024-03-13

## 2024-03-13 RX ORDER — METOCLOPRAMIDE 10 MG/1
10 TABLET ORAL ONCE
Status: COMPLETED | OUTPATIENT
Start: 2024-03-13 | End: 2024-03-13

## 2024-03-13 RX ORDER — ROCURONIUM BROMIDE 10 MG/ML
INJECTION, SOLUTION INTRAVENOUS AS NEEDED
Status: DISCONTINUED | OUTPATIENT
Start: 2024-03-13 | End: 2024-03-13 | Stop reason: SURG

## 2024-03-13 RX ORDER — SODIUM CHLORIDE, SODIUM LACTATE, POTASSIUM CHLORIDE, CALCIUM CHLORIDE 600; 310; 30; 20 MG/100ML; MG/100ML; MG/100ML; MG/100ML
INJECTION, SOLUTION INTRAVENOUS CONTINUOUS
Status: DISCONTINUED | OUTPATIENT
Start: 2024-03-13 | End: 2024-03-13

## 2024-03-13 RX ORDER — EPHEDRINE SULFATE 50 MG/ML
INJECTION, SOLUTION INTRAVENOUS AS NEEDED
Status: DISCONTINUED | OUTPATIENT
Start: 2024-03-13 | End: 2024-03-13 | Stop reason: SURG

## 2024-03-13 RX ORDER — ONDANSETRON 2 MG/ML
4 INJECTION INTRAMUSCULAR; INTRAVENOUS EVERY 6 HOURS PRN
Status: DISCONTINUED | OUTPATIENT
Start: 2024-03-13 | End: 2024-03-13

## 2024-03-13 RX ORDER — DEXAMETHASONE SODIUM PHOSPHATE 10 MG/ML
INJECTION, SOLUTION INTRAMUSCULAR; INTRAVENOUS
Status: DISCONTINUED | OUTPATIENT
Start: 2024-03-13 | End: 2024-03-13 | Stop reason: SURG

## 2024-03-13 RX ORDER — HYDROMORPHONE HYDROCHLORIDE 1 MG/ML
0.6 INJECTION, SOLUTION INTRAMUSCULAR; INTRAVENOUS; SUBCUTANEOUS EVERY 5 MIN PRN
Status: DISCONTINUED | OUTPATIENT
Start: 2024-03-13 | End: 2024-03-13

## 2024-03-13 RX ORDER — MORPHINE SULFATE 4 MG/ML
2 INJECTION, SOLUTION INTRAMUSCULAR; INTRAVENOUS EVERY 10 MIN PRN
Status: DISCONTINUED | OUTPATIENT
Start: 2024-03-13 | End: 2024-03-13

## 2024-03-13 RX ORDER — BUPIVACAINE HYDROCHLORIDE 5 MG/ML
INJECTION, SOLUTION EPIDURAL; INTRACAUDAL AS NEEDED
Status: DISCONTINUED | OUTPATIENT
Start: 2024-03-13 | End: 2024-03-13 | Stop reason: HOSPADM

## 2024-03-13 RX ORDER — FAMOTIDINE 20 MG/1
20 TABLET, FILM COATED ORAL ONCE
Status: COMPLETED | OUTPATIENT
Start: 2024-03-13 | End: 2024-03-13

## 2024-03-13 RX ORDER — CEFAZOLIN SODIUM/WATER 2 G/20 ML
2 SYRINGE (ML) INTRAVENOUS ONCE
Status: COMPLETED | OUTPATIENT
Start: 2024-03-13 | End: 2024-03-13

## 2024-03-13 RX ORDER — DEXAMETHASONE SODIUM PHOSPHATE 4 MG/ML
VIAL (ML) INJECTION AS NEEDED
Status: DISCONTINUED | OUTPATIENT
Start: 2024-03-13 | End: 2024-03-13 | Stop reason: SURG

## 2024-03-13 RX ORDER — ONDANSETRON 2 MG/ML
INJECTION INTRAMUSCULAR; INTRAVENOUS AS NEEDED
Status: DISCONTINUED | OUTPATIENT
Start: 2024-03-13 | End: 2024-03-13 | Stop reason: SURG

## 2024-03-13 RX ADMIN — SODIUM CHLORIDE, SODIUM LACTATE, POTASSIUM CHLORIDE, CALCIUM CHLORIDE: 600; 310; 30; 20 INJECTION, SOLUTION INTRAVENOUS at 09:28:00

## 2024-03-13 RX ADMIN — EPHEDRINE SULFATE 10 MG: 50 INJECTION, SOLUTION INTRAVENOUS at 10:18:00

## 2024-03-13 RX ADMIN — DEXAMETHASONE SODIUM PHOSPHATE 4 MG: 4 MG/ML VIAL (ML) INJECTION at 09:42:00

## 2024-03-13 RX ADMIN — SODIUM CHLORIDE, SODIUM LACTATE, POTASSIUM CHLORIDE, CALCIUM CHLORIDE: 600; 310; 30; 20 INJECTION, SOLUTION INTRAVENOUS at 10:24:00

## 2024-03-13 RX ADMIN — ROCURONIUM BROMIDE 5 MG: 10 INJECTION, SOLUTION INTRAVENOUS at 09:34:00

## 2024-03-13 RX ADMIN — LIDOCAINE HYDROCHLORIDE 5 ML: 10 INJECTION, SOLUTION INFILTRATION; PERINEURAL at 09:10:00

## 2024-03-13 RX ADMIN — CEFAZOLIN SODIUM/WATER 2 G: 2 G/20 ML SYRINGE (ML) INTRAVENOUS at 09:41:00

## 2024-03-13 RX ADMIN — DEXAMETHASONE SODIUM PHOSPHATE 10 MG: 10 INJECTION, SOLUTION INTRAMUSCULAR; INTRAVENOUS at 09:10:00

## 2024-03-13 RX ADMIN — ROPIVACAINE HYDROCHLORIDE 30 ML: 5 INJECTION, SOLUTION EPIDURAL; INFILTRATION; PERINEURAL at 09:10:00

## 2024-03-13 RX ADMIN — ONDANSETRON 4 MG: 2 INJECTION INTRAMUSCULAR; INTRAVENOUS at 09:42:00

## 2024-03-13 RX ADMIN — LIDOCAINE HYDROCHLORIDE 50 MG: 10 INJECTION, SOLUTION EPIDURAL; INFILTRATION; INTRACAUDAL; PERINEURAL at 09:35:00

## 2024-03-13 RX ADMIN — EPHEDRINE SULFATE 10 MG: 50 INJECTION, SOLUTION INTRAVENOUS at 10:08:00

## 2024-03-13 RX ADMIN — EPHEDRINE SULFATE 10 MG: 50 INJECTION, SOLUTION INTRAVENOUS at 09:46:00

## 2024-03-13 RX ADMIN — SODIUM CHLORIDE, SODIUM LACTATE, POTASSIUM CHLORIDE, CALCIUM CHLORIDE: 600; 310; 30; 20 INJECTION, SOLUTION INTRAVENOUS at 09:25:00

## 2024-03-13 NOTE — H&P
H&P Status Update:     Patient has been evaluated and cleared for surgery by PCP in the past 30 days. The consent form was reviewed and signed with the patient. I will perform left foot tarsal tunnel release today under general anesthesia with regional popliteal block to the left lower extremity. Patient was educated that the posterior tibial tendon debridement portion of the procedure will be performed during the next staged procedure with triple arthrodesis at a later date.     No status updates at this time regarding physical exam.       Shiv Mckinney DPM   [phone]

## 2024-03-13 NOTE — OPERATIVE REPORT
Patient Name: Parvin Vargas    : 1970    DOS: 3/13/23      Pre-Operative Diagnosis: Tarsal tunnel syndrome, left     Post-Operative Diagnosis: Tarsal tunnel syndrome, left      Procedure Performed:   Left foot tarsal tunnel release   Application of posterior splint, left lower extremity      Surgeon(s) and Role:     * Shiv Mckinney DPM - Primary     Assistant(s):  Surgical Assistant.: Brittanie Sanchez     Surgical Findings: The flexor retinaculum was released and there were some fibrous bands proximal to the tarsal tunnel that was released as well. There were some adhesions noted along the course of the tibial nerve that were released as well. The fascia to the abductor hallucis muscle was released to decrease pressure upon the tibial nerve as well. There was no damage noted to the tibial nerve or nuerovascular bundle during this procedure. No signs of bleeding intra-operatively.      Hemostasis: Well padded left thigh TQ set at 325 mmHg     Anesthesia: General anesthesia with regional left lower extremity popliteal block      Complications: None     Materials: 3-0 vicryl, 3-0 nylon      Specimen: None     Estimated Blood Loss: 10 mL      Indications for surgery:   Patient was seen in the clinical setting and underwent testing consisting of MRI and EMG and nerve conduction velocity testing which demonstrated that she has severe tarsal tunnel syndrome of the left tarsal tunnel.  Patient's neurologist consultation resulted with the recommendation for tarsal tunnel release.  Patient has completely failed conservative therapy for treatment of tarsal tunnel syndrome for over 6 months duration.  Patient is interested in surgery to release her tarsal tunnel to alleviate the compression on her tibial nerve.  I discussed the risk and benefits of this procedure with the patient in great detail and the patient expressed understanding.  I discussed the risk associate with surgery such as but not  limited to: Postop pain, postop swelling, postop infection, injury to neurovascular bundle, hematoma, postop bleeding in the area, and adequate reduction in pain, complex regional pain syndrome, need for future surgery, recurrence of pain, wound healing problems, DVT, pulmonary embolism.  Patient states that she understands the risks of CV surgery and like to continue with the proposed procedure.      Operative Technique:     Patient was seen in the preop area at which time the left lower extremity was signed by myself with a surgical marker and I marked out the incision site to the tarsal tunnel area of the left ankle.  Reviewed the consent form with the patient and signed a consent form.  It was a  for this process of the patient does not speak great English but does understand some English.  Patient states that she expressed understanding in Danish.  Patient signed the consent form and then received IV antibiotics for antibiotic prophylaxis.  She received 2 g Ancef IV antibiotics.  She was then transferred to the PACU area and got a regional popliteal block to the left lower extremity for postop pain control.  She was then transferred back to the operating room placed in the operative table in supine position.    The anesthesia care team provided the patient with general anesthesia and her airway was secured.  Left lower extremity was then prepped and draped in usual sterile fashion.  Timeout was performed prior to the start of the procedure.  Following timeout, left lower extremity was exsanguinated with Esmarch bandage and the left thigh tourniquet which was well-padded above the left knee was inflated to 325 mmHg of pressure.  Attention was then placed to the tarsal tunnel area in which a 15 blade was used to make a 6 to 8 cm incision along the course of the tibial nerve through the level of skin with care not to cut underlying neurovascular structures.  Incision was then bluntly deepened with a  blunt hemostat.  It was noted that the patient had a lot of subcutaneous fat in this area which was carefully pushed to the side with a 4 x 4 gauze.  I then recognize that the flexor retinaculum did have some areas of tearing present within the retinaculum.  I used blunt dissection scissors to carefully cut the flexor retinaculum from distal to proximal.  After cutting the flexor retinaculum, the neurovascular bundle came into view and I carefully used dissection scissors that were blunt to bluntly dissect the tibial nerve from the surrounding soft tissue structures.  I noted that there is some fibrous bands present proximally above the level of the flexor retinaculum that were causing some compression to the tibial nerve that were released carefully.  I then noticed distally that the fascia of the abductor hallucis was very tight and causing impingement upon the tibial nerve and I released this fascia with the blunt dissection scissors and this noted to improve some of the opinion upon the tibial nerve as well.  I then used my finger for blunt dissection to follow the tibial nerve into the medial and lateral plantar tunnels to ensure that there is no restriction of the nerve to these areas.  Care was taken throughout this entire procedure to avoid cutting the neurovascular bundle and there was no signs of damage to the neurovascular bundle throughout this procedure.  Bleeding was very well-controlled with this procedure and a couple superficial veins were cauterized with Bovie, but otherwise there is no signs of bleeding during this case.  I did note that the tibial nerve deep to the flexor retinaculum did appear like there was some inflammation around the nerve secondary to longstanding compression, but there is no other signs of damage to the nerve.  I then flushed the incision site with copious amounts of 0.9% normal saline.    Attention was then placed towards closure of the surgical incision.  I closed the  incision with 3-0 Vicryl in a simple erupted suture type pattern to approximate subcutaneous tissue layer with buried knots.  I then closed skin with horizontal mattress pattern with skin edges everted to close the skin incision.    I then let down the tourniquet from 325 mmHg to pressure to 0 and immediate warmth perfusion noted to return to the entire left foot and ankle and capillary fill time to all 5 digits with less than 3 seconds.  I then placed a dressing consisting of Betadine soaked Adaptic, 4 x 4 gauze, Kerlix, and 4 inch Ace wrap over the incision site.    I then placed several rolls of cast padding over the left foot and ankle and lower leg and applied a 5 x 30 posterior splint with care to avoid pressure to the posterior heel and wrapped this with two 6 inch Ace wraps with the ankle held at 90 degrees.  Patient was then awakened from general anesthesia and transferred back to the PACU area for further monitoring and care by the anesthesia care team.  Patient tolerated this procedure very well without any complications.  Patient was originally consented for a posterior tibial tendon debridement but this procedure will take place during her next surgery which is planned to correct her flatfoot deformity via triple arthrodesis procedure at which point I will also debride the diseased posterior tibial tendon.    Patient will follow-up in 2 weeks for first postop appointment.  Patient is going to be nonweightbearing and she has a postop pain medication instructions and advised patient to take 81 mg aspirin twice daily for DVT prevention.    Shiv Mckinney DPM   [phone]

## 2024-03-13 NOTE — ADDENDUM NOTE
Addendum  created 03/13/24 1236 by Fran Mejias MD    Child order released for a procedure order, Clinical Note Signed, Intraprocedure Blocks edited, SmartForm saved

## 2024-03-13 NOTE — BRIEF OP NOTE
Pre-Operative Diagnosis: Tarsal tunnel syndrome, left     Post-Operative Diagnosis: Tarsal tunnel syndrome, left      Procedure Performed:   Left foot tarsal tunnel release   Application of posterior splint, left lower extremity     Surgeon(s) and Role:     * Shiv Mckinney DPM - Primary    Assistant(s):  Surgical Assistant.: Brittanie Sanchez     Surgical Findings: The flexor retinaculum was released and there were some fibrous bands proximal to the tarsal tunnel that was released as well. There were some adhesions noted along the course of the tibial nerve that were released as well. The fascia to the abductor hallucis muscle was released to decrease pressure upon the tibial nerve as well. There was no damage noted to the tibial nerve or nuerovascular bundle during this procedure. No signs of bleeding intra-operatively.     Hemostasis: Well padded left thigh TQ set at 325 mmHg    Anesthesia: General anesthesia with regional left lower extremity popliteal block     Complications: None    Materials: 3-0 vicryl, 3-0 nylon      Specimen: None     Estimated Blood Loss: 10 mL      Shiv Mckinney DPM  3/13/2024  10:46 AM

## 2024-03-13 NOTE — ANESTHESIA POSTPROCEDURE EVALUATION
Patient: Parvin Vargas    Procedure Summary       Date: 03/13/24 Room / Location: [facility] MAIN OR 03 / [facility] MAIN OR    Anesthesia Start: 0928 Anesthesia Stop: 1041    Procedure: Left foot tarsal tunnel release (Left: Foot) Diagnosis: (Tarsal tunnel syndrome, left)    Surgeons: Shiv Mckinney DPM Anesthesiologist: Fran Mejias MD    Anesthesia Type: general ASA Status: 3            Anesthesia Type: general    Vitals Value Taken Time   /69 03/13/24 1041   Temp 98.9 °F (37.2 °C) 03/13/24 1040   Pulse 90 03/13/24 1041   Resp 16 03/13/24 1041   SpO2 94 % 03/13/24 1041   Vitals shown include unfiled device data.    EM AN Post Evaluation:   Patient Evaluated in PACU  Patient Participation: complete - patient participated  Level of Consciousness: awake  Pain Management: adequate  Airway Patency:patent  Dental exam unchanged from preop  Yes    Cardiovascular Status: acceptable  Respiratory Status: acceptable  Postoperative Hydration acceptable      CHENCHO PORTILLO CRNA  3/13/2024 10:41 AM

## 2024-03-13 NOTE — ANESTHESIA PROCEDURE NOTES
Airway  Date/Time: 3/13/2024 9:37 AM  Urgency: Elective    Airway not difficult    General Information and Staff    Patient location during procedure: OR  Anesthesiologist: Fran Mejias MD  Resident/CRNA: Lydia Vasquez CRNA  Performed: CRNA   Performed by: Lydia Vasquez CRNA  Authorized by: Fran Mejias MD      Indications and Patient Condition  Indications for airway management: anesthesia  Sedation level: deep  Preoxygenated: yes  Patient position: sniffing  Mask difficulty assessment: 1 - vent by mask    Final Airway Details  Final airway type: endotracheal airway      Successful airway: ETT  Cuffed: yes   Successful intubation technique: Video laryngoscopy  Facilitating devices/methods: intubating stylet  Endotracheal tube insertion site: oral  Blade: Karri (Liquid)  Blade size: #3  ETT size (mm): 7.0    Placement verified by: capnometry   Measured from: lips  ETT to lips (cm): 21  Number of attempts at approach: 1  Number of other approaches attempted: 1    Additional Comments  Extremely poor dentition noted prior to induction. Front left upper tooth missing. Roots exposed and gums inflammed in other surrounding teeth. Preoxygenated. Care taken to avoid teeth during intubation. Teeth and soft tissues appear in preop condition post intubation.

## 2024-03-13 NOTE — ANESTHESIA PREPROCEDURE EVALUATION
Anesthesia PreOp Note    HPI:     Parvin Vargas is a 53 year old female who presents for preoperative consultation requested by: Shiv Mckinney DPM    Date of Surgery: 3/13/2024    Procedure(s):  Left foot tarsal tunnel release and repair of posterior tibial tendon  Indication: Tarsal tunnel syndrome, left    Relevant Problems   No relevant active problems       NPO:  Last Liquid Consumption Date: 24  Last Liquid Consumption Time:   Last Solid Consumption Date: 24  Last Solid Consumption Time:   Last Liquid Consumption Date: 24          History Review:  Patient Active Problem List    Diagnosis Date Noted    ARLETTE (obstructive sleep apnea)     Encounter for therapeutic drug monitoring 05/10/2021    Chronic fatigue 04/15/2019    LOYD (dyspnea on exertion) 04/15/2019    Obesity (BMI 30-39.9) 04/15/2019    Snoring 04/15/2019    Morbid obesity with BMI of 40.0-44.9, adult (HCC) 04/15/2019    Weight gain 04/15/2019    Foot pain 2013       Past Medical History:   Diagnosis Date    Morbid obesity with BMI of 40.0-44.9, adult (HCC)     ARLETTE (obstructive sleep apnea)     Sleep apnea     Visual impairment     READERS       Past Surgical History:   Procedure Laterality Date    CHOLECYSTECTOMY      PRIOR CLASSICAL       SINUS SURGERY           Medications Prior to Admission   Medication Sig Dispense Refill Last Dose    acetaminophen 500 MG Oral Tab Take 1 tablet (500 mg total) by mouth every 6 (six) hours as needed for Pain.   3/11/2024     Current Facility-Administered Medications Ordered in Epic   Medication Dose Route Frequency Provider Last Rate Last Admin    lactated ringers infusion   Intravenous Continuous Shiv Mckinney DPM 20 mL/hr at 24 0824 New Bag at 24 0824    ceFAZolin (Ancef) 2 g in 20mL IV syringe premix  2 g Intravenous Once Shiv Mckinney DPM         Current Outpatient Medications Ordered in Epic   Medication Sig Dispense Refill     acetaminophen 500 MG Oral Tab Take 2 tablets (1,000 mg total) by mouth in the morning and 2 tablets (1,000 mg total) before bedtime. 90 tablet 1    HYDROcodone-acetaminophen 5-325 MG Oral Tab Take 1 tablet by mouth every 4 (four) hours as needed for Pain. 30 tablet 0       Allergies   Allergen Reactions    Shrimp RASH and Tightness in Throat    Daypro [Oxaprozin] Runny nose       History reviewed. No pertinent family history.  Social History     Socioeconomic History    Marital status:    Tobacco Use    Smoking status: Never    Smokeless tobacco: Never   Substance and Sexual Activity    Alcohol use: Never    Drug use: Never       Available pre-op labs reviewed.  Lab Results   Component Value Date    URINEPREG Negative 03/13/2024             Vital Signs:  Body mass index is 43.75 kg/m².   height is 1.524 m (5') and weight is 101.6 kg (224 lb). Her oral temperature is 98.6 °F (37 °C). Her blood pressure is 151/72 and her pulse is 83. Her respiration is 17 and oxygen saturation is 100%.   Vitals:    03/06/24 1505 03/13/24 0757   BP:  151/72   Pulse:  83   Resp:  17   Temp:  98.6 °F (37 °C)   TempSrc:  Oral   SpO2:  100%   Weight: 101.2 kg (223 lb) 101.6 kg (224 lb)   Height: 1.524 m (5') 1.524 m (5')        Anesthesia Evaluation     Patient summary reviewed and Nursing notes reviewed    No history of anesthetic complications   Airway   Mallampati: I  TM distance: >3 FB  Neck ROM: full  Dental      Pulmonary - normal exam   (+) shortness of breath, sleep apnea  Cardiovascular - negative ROS and normal exam    Neuro/Psych      GI/Hepatic/Renal - negative ROS     Endo/Other - negative ROS   Abdominal                  Anesthesia Plan:   ASA:  3  Plan:   General  Informed Consent Plan and Risks Discussed With:  Patient      I have informed Parvin Vargas and/or legal guardian or family member of the nature of the anesthetic plan, benefits, risks including possible dental damage if relevant, major complications,  and any alternative forms of anesthetic management.   All of the patient's questions were answered to the best of my ability. The patient desires the anesthetic management as planned.  Fran Mejias MD  3/13/2024 8:49 AM  Present on Admission:  **None**

## 2024-03-13 NOTE — ANESTHESIA PROCEDURE NOTES
Peripheral Block    Date/Time: 3/13/2024 9:10 AM    Performed by: Fran Mejias MD  Authorized by: Fran Mejias MD      General Information and Staff    Start Time:  3/13/2024 9:10 AM  End Time:  3/13/2024 9:20 AM  Anesthesiologist:  Fran Mejias MD  Performed by:  Anesthesiologist  Patient Location:  PACU    Block Placement: Pre Induction  Site Identification: real time ultrasound guided and image stored and retrievable    Block site/laterality marked before start: site marked  Reason for Block: at surgeon's request and post-op pain management    Preanesthetic Checklist: 2 patient identifers, IV checked, risks and benefits discussed, monitors and equipment checked, pre-op evaluation, timeout performed, anesthesia consent and sterile technique used      Procedure Details    Patient Position:   Prep: ChloraPrep and patient draped    Monitoring:  Cardiac monitor  Block Type:  Popliteal  Laterality:  Left  Injection Technique:  Single-shot    Needle    Needle Type:  Short-bevel  Needle Gauge:  22 G  Needle Length:  90 mm  Needle Localization:  Ultrasound guidance  Reason for Ultrasound Use: appropriate spread of the medication was noted in real time and no ultrasound evidence of intravascular and/or intraneural injection            Assessment    Injection Assessment:  Good spread noted, incremental injection, local visualized surrounding nerve on ultrasound, low pressure, negative aspiration for heme and no pain on injection  Heart Rate Change: No    - Patient tolerated block procedure well without evidence of immediate block related complications.     Medications  3/13/2024 9:10 AM  fentaNYL (SUBLIMAZE) injection 50mcg/ml - Intravenous   100 mcg - 3/13/2024 9:10:00 AM  lidocaine injection 1% - Intradermal   5 mL - 3/13/2024 9:10:00 AM  ropivacaine (NAROPIN) injection 0.5% - Infiltration   30 mL - 3/13/2024 9:10:00 AM  dexamethasone (DECADRON) PF injection 10 mg/ml - Injection   10 mg - 3/13/2024 9:10:00  AM    Additional Comments

## 2024-06-11 ENCOUNTER — OFFICE VISIT (OUTPATIENT)
Dept: SURGERY | Facility: CLINIC | Age: 54
End: 2024-06-11
Payer: COMMERCIAL

## 2024-06-11 VITALS
BODY MASS INDEX: 47.58 KG/M2 | HEIGHT: 59 IN | WEIGHT: 236 LBS | OXYGEN SATURATION: 96 % | HEART RATE: 100 BPM | DIASTOLIC BLOOD PRESSURE: 80 MMHG | SYSTOLIC BLOOD PRESSURE: 126 MMHG

## 2024-06-11 DIAGNOSIS — G47.33 OSA (OBSTRUCTIVE SLEEP APNEA): ICD-10-CM

## 2024-06-11 DIAGNOSIS — R73.03 PRE-DIABETES: Primary | ICD-10-CM

## 2024-06-11 DIAGNOSIS — E55.9 VITAMIN D DEFICIENCY: ICD-10-CM

## 2024-06-11 DIAGNOSIS — R53.82 CHRONIC FATIGUE: ICD-10-CM

## 2024-06-11 DIAGNOSIS — E66.01 MORBID OBESITY WITH BMI OF 45.0-49.9, ADULT (HCC): ICD-10-CM

## 2024-06-11 DIAGNOSIS — Z51.81 ENCOUNTER FOR THERAPEUTIC DRUG MONITORING: ICD-10-CM

## 2024-06-11 PROCEDURE — 99215 OFFICE O/P EST HI 40 MIN: CPT | Performed by: INTERNAL MEDICINE

## 2024-06-11 PROCEDURE — 3008F BODY MASS INDEX DOCD: CPT | Performed by: INTERNAL MEDICINE

## 2024-06-11 PROCEDURE — 3079F DIAST BP 80-89 MM HG: CPT | Performed by: INTERNAL MEDICINE

## 2024-06-11 PROCEDURE — 3074F SYST BP LT 130 MM HG: CPT | Performed by: INTERNAL MEDICINE

## 2024-06-11 RX ORDER — PHENTERMINE HYDROCHLORIDE 15 MG/1
15 CAPSULE ORAL EVERY MORNING
Qty: 30 CAPSULE | Refills: 5 | Status: SHIPPED | OUTPATIENT
Start: 2024-06-11

## 2024-06-11 RX ORDER — TOPIRAMATE 25 MG/1
25 TABLET ORAL EVERY EVENING
Qty: 30 TABLET | Refills: 2 | Status: SHIPPED | OUTPATIENT
Start: 2024-06-11

## 2024-06-11 NOTE — PROGRESS NOTES
[facility], [facility], [place]  [address]  [facility] [address]  Dept: [phone]       Patient:  Parvin Vargas  :      1970  MRN:      [id number]    Chief Complaint:    Chief Complaint   Patient presents with    Follow - Up    Weight Management       SUBJECTIVE     History of Present Illness:  Parvin is being seen today for a follow-up for pre surgical weight loss    Past Medical History:   Past Medical History:    Morbid obesity with BMI of 40.0-44.9, adult (HCC)    ARLETTE (obstructive sleep apnea)    Sleep apnea    Visual impairment    READERS        Comorbidities:  SOB/LOYD-Improvement?  yes, Back pain-Improvement?  yes and Sleep apnea-Improvement?  yes    OBJECTIVE     Vitals: /80 (BP Location: Right arm, Patient Position: Sitting, Cuff Size: adult)   Pulse 100   Ht 4' 11\" (1.499 m)   Wt 236 lb (107 kg)   LMP 2023   SpO2 96%   BMI 47.67 kg/m²     Initial weight loss: +24   Total weight loss: +37   Start weight: 199    Wt Readings from Last 3 Encounters:   24 236 lb (107 kg)   24 224 lb (101.6 kg)   23 223 lb (101.2 kg)       Patient Medications:    Current Outpatient Medications   Medication Sig Dispense Refill    Phentermine HCl 15 MG Oral Cap Take 1 capsule (15 mg total) by mouth every morning. 30 capsule 5    topiramate 25 MG Oral Tab Take 1 tablet (25 mg total) by mouth every evening. 30 tablet 2     Allergies:  Shrimp and Daypro [oxaprozin]     Social History:    Social History     Socioeconomic History    Marital status:      Spouse name: Not on file    Number of children: Not on file    Years of education: Not on file    Highest education level: Not on file   Occupational History    Not on file   Tobacco Use    Smoking status: Never    Smokeless tobacco: Never   Substance and Sexual Activity    Alcohol use: Never    Drug use: Never    Sexual activity: Not on file   Other Topics Concern     Not on file   Social History Narrative    Not on file     Social Determinants of Health     Financial Resource Strain: Not on file   Food Insecurity: Not on file   Transportation Needs: Not on file   Physical Activity: Not on file   Stress: Not on file   Social Connections: Not on file   Housing Stability: Not on file     Surgical History:    Past Surgical History:   Procedure Laterality Date    Cholecystectomy  1996    Prior classical       Sinus surgery         Family History:  No family history on file.    Food Journal  Reviewed and Discussed:       Patient has a Food Journal?: yes   Patient is reading nutrition labels?  yes  Average Caloric Intake:     Average CHO Intake: 120  Is patient exercising? yes  Type of exercise? walking    Eating Habits  Patient states the following:  Eats 3 meal(s) per day  Length of time it takes to consume a meal:  20  # of snacks per day: 1 Type of snacks:  fruit  Amount of soda consumption per day:  regular  Amount of water (in ounces) per day:  64  Drinking between meals only:  yes  Toughest challenge:  soda    Nutritional Goals  Limit carbohydrates to 100 gms per day, Eat 100-200 calories within 1 hour of waking  and Eat 3-4 cups of fresh fruits or vegetables daily    Behavior Modifications Reviewed and Discussed  Plan meals in advance, Read nutrition labels, Drink 64 oz of water per day, Maintain a daily food journal, No drinking 30 minutes before or after meals, Utlize portion control strategies to reduce calorie intake, Identify triggers for eating and manage cues and Eat slowly and take 20 to 30 minutes to complete each meal    Exercise Goals Reviewed and Discussed    Keep walking    ROS:    Constitutional: positive for fatigue  Respiratory: negative  Cardiovascular: negative  Gastrointestinal: negative  Musculoskeletal:negative  Neurological: negative  Behavioral/Psych: negative  Endocrine: negative  All other systems were reviewed and are negative    Physical  Exam:   General appearance: alert, appears stated age, cooperative and moderately obese  Head: Normocephalic, without obvious abnormality, atraumatic  Eyes: conjunctivae/corneas clear. PERRL, EOM's intact. Fundi benign.  Back: symmetric, no curvature. ROM normal. No CVA tenderness.  Lungs: clear to auscultation bilaterally  Heart: S1, S2 normal, no murmur, click, rub or gallop, regular rate and rhythm  Abdomen: soft, non-tender; bowel sounds normal; no masses,  no organomegaly  Extremities: extremities normal, atraumatic, no cyanosis or edema  Pulses: 2+ and symmetric  Skin: Skin color, texture, turgor normal. No rashes or lesions    ASSESSMENT       Encounter Diagnosis(ses):   Encounter Diagnoses   Name Primary?    Pre-diabetes Yes    ARLETTE (obstructive sleep apnea)     Chronic fatigue     Vitamin D deficiency     Encounter for therapeutic drug monitoring     Morbid obesity with BMI of 45.0-49.9, adult (HCC)        PLAN     Discussed with patient the risks and benefits of RYGBP and VSG. The patient is interested in bariatric surgery and has begun our presurgical process.       Fatigue: should improve with weight loss    OBSTRUCTIVE SLEEP APNEA: Given the patient's history suggestive of obstructive sleep apnea as outlined above, consideration for obtaining a sleep study may be warranted.  Further consideration for obtaining the sleep study will be discussed with the patient's PCP.    Goals for next month:  1. Keep a food log.  2. Drink 48-64 ounces of non-caloric beverages per day. No fruit juices or regular soda.  3. Increase activity-upper body exercises, walk 10 minutes per day.  4. Increase fruit and vegetable servings to 5-6 per day.      Attended seminar (should attend new seminar)    Refer to surgical team  Refer to [facility]    PHENTERMINE: restart at 15 mg  Needs new EKG    Topiramate: will start at 25 mg every evening for caravings    Will need bariaric labs    Will give information for cardiologist and pulmonary  so she can start the process of surgical clearance    Medical assistant was in the room to help with translation    Diagnoses and all orders for this visit:    Pre-diabetes  -     EKG 12 Lead to be performed at [facility]; Future  -     Bariatric Surgial Seminar; Future    ARLETTE (obstructive sleep apnea)  -     DIETITIAN EDUCATION INITIAL, DIET (INTERNAL)  -     EKG 12 Lead to be performed at [facility]; Future  -     Bariatric Surgial Seminar; Future    Chronic fatigue  -     DIETITIAN EDUCATION INITIAL, DIET (INTERNAL)  -     EKG 12 Lead to be performed at [facility]; Future  -     Bariatric Surgial Seminar; Future    Vitamin D deficiency  -     DIETITIAN EDUCATION INITIAL, DIET (INTERNAL)  -     EKG 12 Lead to be performed at [facility]; Future  -     Bariatric Surgial Seminar; Future    Encounter for therapeutic drug monitoring  -     DIETITIAN EDUCATION INITIAL, DIET (INTERNAL)  -     EKG 12 Lead to be performed at [facility]; Future  -     Bariatric Surgial Seminar; Future    Morbid obesity with BMI of 45.0-49.9, adult (HCC)  -     DIETITIAN EDUCATION INITIAL, DIET (INTERNAL)  -     Surgical Bariatric Referral - Dr. Dheeraj Yoo [facility] EMMG 9  -     EKG 12 Lead to be performed at [facility]; Future  -     Phentermine HCl 15 MG Oral Cap; Take 1 capsule (15 mg total) by mouth every morning.  -     topiramate 25 MG Oral Tab; Take 1 tablet (25 mg total) by mouth every evening.  -     Bariatric Surgial Seminar; Future          Aba Slater MD

## 2024-06-26 ENCOUNTER — TELEPHONE (OUTPATIENT)
Dept: SURGERY | Facility: CLINIC | Age: 54
End: 2024-06-26

## 2024-06-28 ENCOUNTER — OFFICE VISIT (OUTPATIENT)
Dept: SURGERY | Facility: CLINIC | Age: 54
End: 2024-06-28

## 2024-06-28 VITALS — WEIGHT: 229 LBS | BODY MASS INDEX: 46.16 KG/M2 | HEIGHT: 59.02 IN

## 2024-06-28 DIAGNOSIS — E66.01 MORBID OBESITY WITH BMI OF 45.0-49.9, ADULT (HCC): Primary | ICD-10-CM

## 2024-06-28 PROCEDURE — 97802 MEDICAL NUTRITION INDIV IN: CPT | Performed by: DIETITIAN, REGISTERED

## 2024-06-28 PROCEDURE — 3008F BODY MASS INDEX DOCD: CPT | Performed by: DIETITIAN, REGISTERED

## 2024-06-28 NOTE — PROGRESS NOTES
[facility]  [address]  [facility] [address]  Dept: [phone]  Loc: [phone]    06/28/24    Bariatric Initial Nutrition Assessment    Parvin Vargas is a 54 year old female.    Referring Physician: Aba Slater  Reason for MNT Referral: Initial assessment for: Vertical Sleeve Gastrectomy      Assessment   Medical Diagnosis:  morbidly obese    Patient Active Problem List   Diagnosis    Foot pain    Chronic fatigue    LOYD (dyspnea on exertion)    Obesity (BMI 30-39.9)    Snoring    Morbid obesity with BMI of 45.0-49.9, adult (HCC)    Weight gain    Encounter for therapeutic drug monitoring    ARLETTE (obstructive sleep apnea)    Vitamin D deficiency    Pre-diabetes       Past Medical History:   Past Medical History:    Morbid obesity with BMI of 40.0-44.9, adult (HCC)    ARLETTE (obstructive sleep apnea)    Sleep apnea    Visual impairment    READERS       Weight history:  Struggled with weight  most of adult life, Pt's highest adult weight 236 at 53 y/o, and Pt's lowest adult weight 150 at 27 y/o    Patient has tried: Calorie counting : self-directed    Patient's most successful weight loss attempt was self-directed. Lost 20 lbs and kept it off 1 months.    Patient has tried these medications for weight loss: Topamax/Topriamate    Patient has received these behavioral treatments for weight loss: None    Patient is being followed by Dr. Slater for medical weight loss.    Patient has completed medical weight management Not Applicable    Patient has support from: Family, Primary care physician, and Friends    Patient acknowledges binge eating behavior: Eats a larger amount of food than normal in a short period of time.    Patient engages in binge-purge behavior: no    Patient uses laxatives or vomits as a means of purging: no    Patient complains of: none    Patient's motivation for bariatric surgery: she want to be healthy and doesn't want to develop  T2DM    Allergies:  Allergies   Allergen Reactions    Shrimp RASH and Tightness in Throat    Daypro [Oxaprozin] Runny nose       Height:    Ht Readings from Last 1 Encounters:   06/11/24 4' 11\" (1.499 m)       Weight:    Wt Readings from Last 6 Encounters:   06/11/24 236 lb (107 kg)   03/13/24 224 lb (101.6 kg)   11/06/23 223 lb (101.2 kg)   02/15/22 211 lb (95.7 kg)   02/01/22 212 lb 9.6 oz (96.4 kg)   05/10/21 197 lb (89.4 kg)       Labs:    Lab Results   Component Value Date    GLU 97 07/20/2019    BUN 11 07/20/2019    BUNCREA 15.3 07/20/2019    CREATSERUM 0.72 07/20/2019    ANIONGAP 5 07/20/2019    GFRNAA 100 07/20/2019    GFRAA 115 07/20/2019    CA 8.4 (L) 07/20/2019    OSMOCALC 289 07/20/2019    ALKPHO 70 07/20/2019    AST 16 07/20/2019    ALT 21 07/20/2019    BILT 0.3 07/20/2019    TP 7.5 07/20/2019    ALB 3.7 07/20/2019    GLOBULIN 3.8 07/20/2019     07/20/2019    K 4.4 07/20/2019     07/20/2019    CO2 27.0 07/20/2019     Lab Results   Component Value Date    MG 2.2 07/20/2019     Phosphorus (mg/dL)   Date Value   07/20/2019 3.4       Thyroid:    Lab Results   Component Value Date    TSH 1.650 07/20/2019       Iron Panel:   Lab Results   Component Value Date    IRON 48 (L) 07/20/2019       Diabetes:    Lab Results   Component Value Date     07/20/2019    A1C 5.5 07/20/2019       Lipid Panel:   Lab Results   Component Value Date    CHOLEST 133 07/20/2019    TRIG 76 07/20/2019    HDL 47 07/20/2019    LDL 71 07/20/2019    VLDL 15 07/20/2019    NONHDLC 86 07/20/2019       Vitamins/Minerals:  Lab Results   Component Value Date    B12 521 07/20/2019     Lab Results   Component Value Date    VITD 16.4 (L) 07/20/2019     Lab Results   Component Value Date/Time    THIAMINE 108 07/20/2019 08:11 AM      No results found for: \"VITB1\"  Lab Results   Component Value Date/Time    FOLIC 14.1 07/20/2019 08:11 AM       Relevant Meds:      Current Outpatient Medications:     Phentermine HCl 15 MG Oral Cap,  Take 1 capsule (15 mg total) by mouth every morning., Disp: 30 capsule, Rfl: 5    topiramate 25 MG Oral Tab, Take 1 tablet (25 mg total) by mouth every evening., Disp: 30 tablet, Rfl: 2    Smoker:     History   Smoking Status    Never   Smokeless Tobacco    Never       Alcohol Intake:    History   Alcohol Use Never       Drugs:    History   Drug Use Unknown       Estimated Kcal Needs ([place] Jeor): 1561 kcal/day   Estimated Protein Needs:  104 grams/day  Estimated Fluid Needs: Aim for 64 ozs per day    Social:  Household: , 4 daughter (28,24,24,22)  Occupation:     Weight Loss Medications: phentermine, topiramate    Patient started weight loss clinic on 4/15/19 with initial weight of 199 lbs 12.8 oz. She previously met with Adele Leong RDN.  Would like to reach a goal weight of 150 lbs. Today's  4' 11.02\" (1.499 m)   Wt 229 lb (103.9 kg)   LMP 12/01/2023   BMI 46.23 kg/m²  indicating a 30 lbs weight loss since stating the WLC.     Wt Readings from Last 6 Encounters:   06/11/24 236 lb (107 kg)   03/13/24 224 lb (101.6 kg)   11/06/23 223 lb (101.2 kg)   02/15/22 211 lb (95.7 kg)   02/01/22 212 lb 9.6 oz (96.4 kg)   05/10/21 197 lb (89.4 kg)       Diet History:  Previous Attempts at Weight Loss: [facility] in [place] + phentermine (lost 30 lbs)    Patient seen in clinic today for nutrition counseling to assist with weight loss. Patient is interested in gastric sleeve. Patient attended the bariatric seminar. Reports certain challenges/barriers associated with weight loss. Specifically reports she prefers meals over grazing, but her portions are larger than they need to be. She has a sweet tooth, but has been trying to eat more fruit to control her cravings. Discussed importance of getting 3 meals/day for adequate nutrition, focusing on at least 3-4 oz protein/meal. Reviewed eating slower (20-30 minutes/meal) and  beverage from meals by at least 30 minutes. Reviewed eliminating caffeine  from her diet and to stop using straws. S/P cholecystectomy in 1996 with no malabsorption issues reported.She is allergic to shrimp. Discussed typical diet which consists of 2 meals and 1 snacks/day. She is not currently tracking her meals. Patient was encouraged to start tracking dietary intake using an isabella at initial visit. She is aiming for <100 grams CHO/day. She has noticed a decrease in appetite and cravings since starting phentermine and topiramate. No adverse SE reported. Her and her  are responsible for grocery shopping and cooking. She is eating out 2 x month. No food log provided; verbal dietary recall listed below.    Typical Diet:  Breakfast: SKIPS  Lunch: (11:00 am) protein with beans OR chicken and rice OR chicken noodle  Dinner: (5:00 - 5:30 pm) steak with beans OR chorizo with eggs OR chicken flautas with sour cream and salsa OR \"mexican salad\" (chicken oer shredded lettuve, tomatoes, salsa, sour cream)  Snack: (8:00 pm) banana and yogurt OR peach    Beverages: water,coffee with cream,  Gatorade  Alcoholic Beverages: N/A    Total Kcal: unsure  Excessive in: nothing  Inadequate in:  protein and nothing   Trigger Foods: No  Patient currently consumes caffeine: 1-3 times/week  Patient currently consumes soda: none    Vitamins/Minerals: none  Food Intolerances: N/A  Food Allergies:  Shrimp    Exercise  No structured exercise routine at this time. She is recovering from left foot sx.    Intervention   1.  Nutrition Rx:  Reviewed  bariatric meal plan, Discussed portion control, Reviewed Bariatric Diet Stages 1-4, Discussed goals, and Obtain MVI  2.  Coordination of care: attend support group prior to surgery; reminder for importance of multidisciplinary consultations.  3.  Materials given: [facility] Bariatric Manual    Education:  Review of Food Intake (pt provided food log/journal - no)  Importance of keeping a food log  Discussion of food modification to current diet (see GOALS)  Discussed ideas for  breakfast, lunch, dinner, snacks  Basic Nutrition Education  Discussion of food groups and what constitutes a serving  Discussion of number of servings in each food group to have per meal or snack  Discussed use of high fiber vs refined carbs; use of lowfat proteins; saturated vs unsaturated fats  Importance of eating consistently and not skipping meals  Importance of protein for satiety  Importance of meal planning  Discussion of need for exercise for weight loss (see GOALS)    Handouts Provided: Healthy Plate, Free Foods List, Protein Supplement Suggestions, Portion Control Tips, List of Carbohydrate, Protein, and Fat Sources.    Goals:   Keep a food record, My Net Diary, select the macros dashboard.  Strive to consume at least 3 meals/day, working towards 4-6 meals/snacks per day.  Include protein and produce when you eat.    Aim for 100 grams carbohydrates or less/day.  Aim for 100 grams of protein per day - 20-30 grams protein/meal and 10-15 grams protein/snack.    Aim for 64 oz per day of water.  (Try protein water, adding True Lemon, Crystal Light).  Practice eating strategies, eat separately from drinking, avoid straws, chew food 20-30 times before swallowing. Make the meals last 30 minutes.  Continue to limit caffeine and carbonation.  Start probiotic: VSL #3 ([facility])  Purchase bariatric chewable MVI  Purchase pre-sx body wash  Purchase Ensure Pre-Surgery beverages  Start sampling different protein shake and trying the different s/p diet consistencies.  Exercise with a goal of 30 minutes per day for exercise (for example,walking).    Strength training at least 10 minutes 3 days per week.      Monitor/Evaluate   Food/fluid intake/choices  Nutrition Rx  Anthropometric measurements  Body composition-InBody Testing at next appointment per surgeon recommendation  Updated labs  F/U MD plan of care  F/U to reinforce goals  F/U on vitamin/mineral supplementation  Review quizzes   for liquid protein diet  prior to surgery    Additional RD visits required to review concepts? yes  Patient understands protein requirements? yes  Patient understand fluid requirements (amount and method of intake)? yes  Patient understands post-operative diet? yes  Patient ready for Liquid Protein Education? no    Bertha Toussaint RDN, [place]AS[place]  Visit Length: 8:18 am to 10:00 am -  102 minutes

## 2024-06-28 NOTE — PATIENT INSTRUCTIONS
It was so nice meeting you today. Please reach out with any questions or concerns. Thank you for including me in your weight loss journey. Here's a review of today's visit:    Goals:   Keep a food record, My Net Diary, select the macros dashboard.  Strive to consume at least 3 meals/day, working towards 4-6 meals/snacks per day.  Include protein and produce when you eat.    Aim for 100 grams carbohydrates or less/day.  Aim for 100 grams of protein per day - 20-30 grams protein/meal and 10-15 grams protein/snack.    Aim for 64 oz per day of water.  (Try protein water, adding True Lemon, Crystal Light).  Practice eating strategies, eat separately from drinking, avoid straws, chew food 20-30 times before swallowing. Make the meals last 30 minutes.  Continue to limit caffeine and carbonation.  Start probiotic: VSL #3 ([facility])  Purchase bariatric chewable MVI  Purchase pre-sx body wash  Purchase Ensure Pre-Surgery beverages  Start sampling different protein shake and trying the different s/p diet consistencies.  Exercise with a goal of 30 minutes per day for exercise (for example,walking).    Strength training at least 10 minutes 3 days per week.   no

## 2024-08-08 NOTE — PROGRESS NOTES
[facility] BARIATRIC AND WEIGHT LOSS CLINIC  [address]  [facility] [address]  Dept: [phone]  Loc: [phone]    08/08/24    Bariatric Follow-up Nutrition Session  Parvin Vargas is a 54 year old female.     Assessment   Procedure:  Vertical Sleeve Gastrectomy  Here for medical weight management: yes  Surgery Date:  TBD  Medical Diagnosis:  morbidly obese    Labs:  Lab Results   Component Value Date    GLU 97 07/20/2019    BUN 11 07/20/2019    BUNCREA 15.3 07/20/2019    CREATSERUM 0.72 07/20/2019    ANIONGAP 5 07/20/2019    GFRNAA 100 07/20/2019    GFRAA 115 07/20/2019    CA 8.4 (L) 07/20/2019    OSMOCALC 289 07/20/2019    ALKPHO 70 07/20/2019    AST 16 07/20/2019    ALT 21 07/20/2019    BILT 0.3 07/20/2019    TP 7.5 07/20/2019    ALB 3.7 07/20/2019    GLOBULIN 3.8 07/20/2019     07/20/2019    K 4.4 07/20/2019     07/20/2019    CO2 27.0 07/20/2019     Lab Results   Component Value Date    MG 2.2 07/20/2019      Phosphorus (mg/dL)   Date Value   07/20/2019 3.4       Thyroid:    Lab Results   Component Value Date    TSH 1.650 07/20/2019       Iron Panel:  Lab Results   Component Value Date    IRON 48 (L) 07/20/2019       CBC:  Lab Results   Component Value Date    WBC 5.3 07/20/2019    WBC 5.1 04/02/2018     Lab Results   Component Value Date    HGB 11.4 (L) 07/20/2019    HGB 12.0 04/02/2018      Lab Results   Component Value Date    .0 07/20/2019     04/02/2018       Diabetes:    Lab Results   Component Value Date     07/20/2019    A1C 5.5 07/20/2019       Lipid Panel:  Lab Results   Component Value Date    CHOLEST 133 07/20/2019    TRIG 76 07/20/2019    HDL 47 07/20/2019    LDL 71 07/20/2019    VLDL 15 07/20/2019    NONHDLC 86 07/20/2019        Vitamins/Minerals:  Lab Results   Component Value Date    B12 521 07/20/2019     Lab Results   Component Value Date    VITD 16.4 (L) 07/20/2019     Lab Results   Component Value Date/Time     THIAMINE 108 07/20/2019 08:11 AM      No results found for: \"VITB1\"  Lab Results   Component Value Date/Time    FOLIC 14.1 07/20/2019 08:11 AM        Meds:     Current Outpatient Medications:     Phentermine HCl 15 MG Oral Cap, Take 1 capsule (15 mg total) by mouth every morning., Disp: 30 capsule, Rfl: 5    topiramate 25 MG Oral Tab, Take 1 tablet (25 mg total) by mouth every evening., Disp: 30 tablet, Rfl: 2    Social:  Household: , 4 daughter (28,24,24,22)  Occupation:     Number of Consults with [facility] RDN: 2  She previously met with Adele Leong RDN.  Surgeon: no - going to see Dr. Yoo  Cardiologist: yes; approved  Pulmonologist: scheduled for November  Psychologist: no    Weight Loss Medications: phentermine, topiramate    Bariatric Vitamins: no    Patient started weight loss clinic on 4/15/19 with initial weight of 199 lbs 12.8 oz. Would like to reach a goal weight of 150 lbs.  Today's Ht 4' 11.02\" (1.499 m)   Wt 220 lb (99.8 kg)   LMP 12/01/2023   BMI 44.41 kg/m²  indicating a 21 lbs weight loss since stating the [facility].     Weight:    Wt Readings from Last 6 Encounters:   06/28/24 229 lb (103.9 kg)   06/11/24 236 lb (107 kg)   03/13/24 224 lb (101.6 kg)   11/06/23 223 lb (101.2 kg)   02/15/22 211 lb (95.7 kg)   02/01/22 212 lb 9.6 oz (96.4 kg)       Diet History:  Patient seen in clinic today for nutrition counseling to assist with weight loss. Today we did not reviewed her quizzes or signs the Commitment To Bariatric Surgery. Patient is interested in gastric sleeve. Her friend is also seeing Dr. Slater and is going to have bariatric sx. Patient attended the bariatric seminar. Reports certain challenges/barriers associated with weight loss. Specifically reports Specifically reports her portions at meals are about 1/2 of what they were before and it takes her 20-30  minutes to eat a meal. Patient is taking small bites (quarter sized), is using a smaller spoon and is chewing each bite 20 x  before swallowing. Patient takes small sips and is  her beverages from the meals by 15 minutes. Patient has eliminated caffeine, alcohol, and carbonated beverages. Patient has stopped using straws and chew gum. Historically she prefers meals over grazing, but her portions are larger than they need to be. She has a sweet tooth, but has been trying to eat more fruit to control her cravings. Discussed importance of getting 3 meals/day for adequate nutrition, focusing on at least 3-4 oz protein/meal. Reviewed eating slower (20-30 minutes/meal) and  beverage from meals by at least 30 minutes. Reviewed eliminating caffeine from her diet and to stop using straws. S/P cholecystectomy in 1996 with no malabsorption issues reported.She is allergic to shrimp. Discussed typical diet which consists of 2 meals and 1 snacks/day. She has started tracking her meals via MND, but her phone is not working at today's visit. She is aiming for <100 grams CHO/day. She has noticed a decrease in appetite and cravings since starting phentermine and topiramate. No adverse SE reported. Her and her  are responsible for grocery shopping and cooking. She is eating out 2 x month. No food log provided; verbal dietary recall listed below.    Typical Diet:  Breakfast: (9:00 am) 2 HB eggs OR oatmeal OR Fair Life protein shake  Lunch: (11:00 am) protein with beans OR chicken and rice OR tuna salad with 4 crackers  Dinner: (5:00 - 5:30 pm) steak with beans OR chorizo with beans OR chicken flautas with sour cream and salsa OR \"mexican salad\" (chicken over shredded lettuce, tomatoes, salsa, sour cream)  Snack: (8:00 pm) greek yogurt OR peach OR SKIPS      Beverages: water, Gatorade  Alcoholic Beverages: N/A    Average Calories:  unsure  Average CHO: unsure grams/day  Average PRO: unsure grams/day  Average H20: 64+ oz/day  Patient currently consumes caffeine: no  Patient currently consumes soda: no    Patient has made some  modifications and adjustments to diet: yes, see above  Food Intolerances: N/A  Food Allergies:  Shrimp  Vitamin/mineral supplements:  none  Protein supplements:  Fair Life     Exercise:  Walking more around her house. She is recovering from left foot sx. She intends to start walking around the block this week.    Body Composition Analysis #1  1. Weight 220.6 lbs  2. BMI 44.5  3. BMR 1392 kcal  4. Percent body fat 52.7% (116.4 lbs)  5. Visceral fat level 20 (goal is <10)  6. ECW/TBW 0.391  7. SMM (skeletal muscle mass) 57.1 lbs               Lean body mass for arms (R & L) 6.44 lbs, 123.6% & 6.17 lbs, 118.9%               Lean body mass for trunk 50.6 lbs, 110.4%               Lean body mass for legs (R & L) 14.51 lbs, 89.4% & 15.52 lbs, 95.7%  8. Body fat mass 116.4 lbs   9. Recommended Body fat amount loss 85.1 lb  10. Recommendations/Status adding lower body strength training as tolerated s/p foot sx. Aim for <100 grams CHO/day and  grams protein/day        Nutrition Diagnosis     Nutrition Diagnosis: Morbid obesity: Improvement shown     Intervention     Goals:  Keep a food log (My Net Diary, TalkApolis) as an effective tool to help track food choices  Focus on healthy plate; 1/2 plate vegetables, 1/4 plate protein, 1/4 plate carbohydrates  Avoid skipping meals - eat every 3-4 hours  Start each meal with protein first  Aim for  grams protein/day or 20-30 grams protein/meal  Quick convenient protein options: tuna, deli meat (turkey, chicken, roast beef), pre-cooked shrimp or chicken, greek yogurt, cottage cheese, protein shakes  When choosing yogurt aim for option consisting of 10-15 grams protein and  calories/serving  High protein yogurt examples: Siggi;s, Welsh, Two Good, Oikos Triple Zero, Dannon light and fit greek, YQ by Yoplait, Fage, Chobani Less Sugar  Aim for <100 grams carbohydrates/day   Aim for 64 oz of water/day  Eat SLOWLY. Meal should take 20-30 minutes to even though you are  eating very small amounts   Cut each bite into dime sized pieces  Chew each bite 20-30 x before swallowing  Do not eat and drink at the same time. Do not drink for 30 minutes before or after a meal  Avoid carbonation, straws, and chewing gum  Eliminate caffeine for at least 3 months and alcohol for at least 1 year after bariatric surgery  Take bariatric vitamins as recommended  Meal plan ahead of time  Use measuring cups and/or food scale for portion control  Purchase bariatric chewable MVI  Purchase pre-sx body wash  Purchase Ensure Pre-Surgery beverages  Start sampling different protein shake and trying the different s/p diet consistencies.    Exercise:  Stay active - focus on realistic activities that fit into your schedule  Plan ahead of exercise - treat as an appointment  When getting started with exercise, slowly build up duration and intensity  Aim for 30 minutes or more of moderate to vigorous exercise 5 x week  Incorporate strength training 3 x week for at least 10 minutes    Behavior Modification:  Set small goals for self and focus on realistic, attainable changes to work on long-term.    Monitor/Evaluate     Anthropometric measurements, Food/fluid intake/choices, Food intolerances, Activity level, Vitamin/mineral supplementation, Reinforce goals, and Calorie/protein intake  Additional RD visits required to review concepts? yes  Patient has met:  Surgeon: no - going to see Dr. Yoo  Cardiologist: yes; approved  Pulmonologist: scheduled for November  Psychologist: no  Patient has completed the quizzed? no  Patient understands protein requirements? yes  Patient understand fluid requirements (amount and method of intake)? yes  Patient understands post-operative diet? yes  Patient keeping consistent food records? yes  Patient has purchased bariatric vitamins: no  Patient has purchased CHG body wash? no  Patient has purchased Pre-Surgery CHO beverage: no  Patient ready for Liquid Protein Education? no    Bertha  Norbert THORNTON, YASMANY  Visit Length: 10:02 am to 11:25 am - 83 minutes

## 2024-08-09 ENCOUNTER — OFFICE VISIT (OUTPATIENT)
Dept: SURGERY | Facility: CLINIC | Age: 54
End: 2024-08-09
Payer: COMMERCIAL

## 2024-08-09 VITALS — HEIGHT: 59.02 IN | BODY MASS INDEX: 44.35 KG/M2 | WEIGHT: 220 LBS

## 2024-08-09 DIAGNOSIS — R73.03 PRE-DIABETES: ICD-10-CM

## 2024-08-09 DIAGNOSIS — E66.01 MORBID OBESITY WITH BMI OF 45.0-49.9, ADULT (HCC): Primary | ICD-10-CM

## 2024-08-09 DIAGNOSIS — G47.33 OSA (OBSTRUCTIVE SLEEP APNEA): ICD-10-CM

## 2024-08-09 PROCEDURE — 3008F BODY MASS INDEX DOCD: CPT | Performed by: DIETITIAN, REGISTERED

## 2024-08-09 PROCEDURE — 97803 MED NUTRITION INDIV SUBSEQ: CPT | Performed by: DIETITIAN, REGISTERED

## 2024-08-09 PROCEDURE — 0358T BIA WHOLE BODY: CPT | Performed by: DIETITIAN, REGISTERED

## 2024-08-09 NOTE — PATIENT INSTRUCTIONS
Congratulations on your current weight loss success! Please reach out with any questions of concerns. Thank you for including me in your weight loss journey. Here's a review of today's visit:    Body Composition Analysis #1  1. Weight 220.6 lbs  2. BMI 44.5  3. BMR 1392 kcal  4. Percent body fat 52.7% (116.4 lbs)  5. Visceral fat level 20 (goal is <10)  6. ECW/TBW 0.391  7. SMM (skeletal muscle mass) 57.1 lbs               Lean body mass for arms (R & L) 6.44 lbs, 123.6% & 6.17 lbs, 118.9%               Lean body mass for trunk 50.6 lbs, 110.4%               Lean body mass for legs (R & L) 14.51 lbs, 89.4% & 15.52 lbs, 95.7%  8. Body fat mass 116.4 lbs   9. Recommended Body fat amount loss 85.1 lb  10. Recommendations/Status adding lower body strength training as tolerated s/p foot sx. Aim for <100 grams CHO/day and  grams protein/day        Goals:  Keep a food log (My Net Diary, [employer]Rewards) as an effective tool to help track food choices  Focus on healthy plate; 1/2 plate vegetables, 1/4 plate protein, 1/4 plate carbohydrates  Avoid skipping meals - eat every 3-4 hours  Start each meal with protein first  Aim for  grams protein/day or 20-30 grams protein/meal  Quick convenient protein options: tuna, deli meat (turkey, chicken, roast beef), pre-cooked shrimp or chicken, greek yogurt, cottage cheese, protein shakes  When choosing yogurt aim for option consisting of 10-15 grams protein and  calories/serving  High protein yogurt examples: Siggi;s, Romansh, Two Good, Oikos Triple Zero, Dannon light and fit greek, YQ by Yoplait, Faalka, Chobani Less Sugar  Aim for <100 grams carbohydrates/day   Aim for 64 oz of water/day  Eat SLOWLY. Meal should take 20-30 minutes to even though you are eating very small amounts   Cut each bite into dime sized pieces  Chew each bite 20-30 x before swallowing  Do not eat and drink at the same time. Do not drink for 30 minutes before or after a meal  Avoid carbonation,  straws, and chewing gum  Eliminate caffeine for at least 3 months and alcohol for at least 1 year after bariatric surgery  Take bariatric vitamins as recommended  Meal plan ahead of time  Use measuring cups and/or food scale for portion control  Purchase bariatric chewable MVI  Purchase pre-sx body wash  Purchase Ensure Pre-Surgery beverages  Start sampling different protein shake and trying the different s/p diet consistencies.    Exercise:  Stay active - focus on realistic activities that fit into your schedule  Plan ahead of exercise - treat as an appointment  When getting started with exercise, slowly build up duration and intensity  Aim for 30 minutes or more of moderate to vigorous exercise 5 x week  Incorporate strength training 3 x week for at least 10 minutes    Behavior Modification:  Set small goals for self and focus on realistic, attainable changes to work on long-term.

## 2024-08-23 ENCOUNTER — TELEPHONE (OUTPATIENT)
Dept: SURGERY | Facility: CLINIC | Age: 54
End: 2024-08-23

## 2024-08-23 NOTE — TELEPHONE ENCOUNTER
Left a voicemail for patient to call the Bariatric office to reschedule an appointment with Dr. Bolton for 8/27/24 to 8/26/24 @ 1:00 pm. DT

## 2024-08-26 ENCOUNTER — DOCUMENTATION ONLY (OUTPATIENT)
Dept: SURGERY | Facility: CLINIC | Age: 54
End: 2024-08-26

## 2024-08-27 ENCOUNTER — OFFICE VISIT (OUTPATIENT)
Dept: SURGERY | Facility: CLINIC | Age: 54
End: 2024-08-27
Payer: COMMERCIAL

## 2024-08-27 ENCOUNTER — DOCUMENTATION ONLY (OUTPATIENT)
Dept: SURGERY | Facility: CLINIC | Age: 54
End: 2024-08-27

## 2024-08-27 VITALS
OXYGEN SATURATION: 94 % | HEART RATE: 76 BPM | DIASTOLIC BLOOD PRESSURE: 78 MMHG | HEIGHT: 59 IN | BODY MASS INDEX: 43.95 KG/M2 | WEIGHT: 218 LBS | SYSTOLIC BLOOD PRESSURE: 118 MMHG

## 2024-08-27 NOTE — PROGRESS NOTES
Oriented pt to the bariatric program; provided/reviewed bariatric packet of info, time line, referrals, etc; pt agreed and verbalized understanding; attended seminar on 06/17/2024

## 2024-08-27 NOTE — PROGRESS NOTES
[facility], [facility] [place]  [address] [facility] [address] [place] [id number]  Dept: [phone]    2024  Bariatric New Patient Evaluation    Chief Complaint:  morbid obesity     History of Present Illness:  Parvin Vargas is a 54 year old female presenting for evaluation for potential bariatric surgery she is interested in sleeve gastrectomy.  She denies any reflux and states she generally does not gravitate towards sweet foods.  She has been evaluated for some time with all the other healthcare providers and essentially has her workup almost done with the exception of work with the dietitian.  She has been seeing medical bariatrics for some time and is lost approximately 20 pounds successfully.  Today we discussed the operation of sleeve gastrectomy as well as the operation of gastric bypass highlighting their differences strengths and weaknesses.  The patient herself is interested in sleeve gastrectomy and all of her conversations were held in Croatian today    PCP Dr Man Slater, phentermine, topirimate  Real Stone, cont f/u    PMH Denies, possible sleep apnea  PSH C section x2, lap jayme, nasal surgery, left foot surgery  Meds Phentermine, topirimate    Past Medical History:    Past Medical History:    Morbid obesity with BMI of 40.0-44.9, adult (HCC)    ARLETTE (obstructive sleep apnea)    Sleep apnea    Visual impairment    READERS       Past Surgical History:    Past Surgical History:   Procedure Laterality Date    Cholecystectomy      Prior classical       Sinus surgery           Family History:  No family history on file.    Social History:    Social History     Socioeconomic History    Marital status:    Tobacco Use    Smoking status: Never    Smokeless tobacco: Never   Substance and Sexual Activity    Alcohol use: Never    Drug use: Never       Medications:   Current Outpatient Medications:     Phentermine HCl 15 MG Oral Cap,  Take 1 capsule (15 mg total) by mouth every morning., Disp: 30 capsule, Rfl: 5    topiramate 25 MG Oral Tab, Take 1 tablet (25 mg total) by mouth every evening., Disp: 30 tablet, Rfl: 2     Allergies:    Allergies   Allergen Reactions    Shrimp RASH and Tightness in Throat    Daypro [Oxaprozin] Runny nose       ROS: Negative except as above    Physical Exam:  Vital Signs:  /78   Pulse 76   Ht 4' 11\" (1.499 m)   Wt 218 lb (98.9 kg)   LMP 12/01/2023   SpO2 94%   BMI 44.03 kg/m²   BMI:  Body mass index is 44.03 kg/m².  Patient is young healthy appearing and moves easily she is friendly.  She has no scleral icterus no cervical or supraclavicular lymphadenopathy there is no thyromegaly.  Her breathing is unlabored lungs are clear heart tones normal.  Abdomen is soft faint laparoscopic scars are present.  She has no peripheral edema  Assessment: Morbid obesity interested in weight loss surgery, specifically sleeve gastrectomy.  I again went over the operation itself going over the anatomic changes made, ports, anticipated recovery and potential risks.  We discussed the importance of lifestyle modification and diet both before and certainly after the operation itself.  She had specific questions about the liquid diet and she does recall the diet progression afterwards based on the instruction from the dietitian.  She has been successful up to this point and has good understanding of the operation and requirements for success.  Apparently she has been cleared by both pulmonologist cardiologist and psychologist though I do not have this documentation.  She will continue to follow-up with the dietitian and see me in 1 month.    Plan: Follow-up in 1 month I would anticipate being able to schedule her for sleeve gastrectomy fairly soon given her success, understanding of the operation and completeness of her preoperative workup already    Jordan Moon MD  8/27/2024

## 2024-09-10 NOTE — PROGRESS NOTES
[facility]  [address]  [facility] [address]  Dept: [phone]  Loc: [phone]    09/10/24    Bariatric Follow-up Nutrition Session  Parvin Vargas is a 54 year old female.     Assessment   Procedure:  Vertical Sleeve Gastrectomy  Here for medical weight management: yes  Surgery Date:  TBD  Medical Diagnosis:  morbidly obese    Labs:  Lab Results   Component Value Date    GLU 97 07/20/2019    BUN 11 07/20/2019    BUNCREA 15.3 07/20/2019    CREATSERUM 0.72 07/20/2019    ANIONGAP 5 07/20/2019    GFRNAA 100 07/20/2019    GFRAA 115 07/20/2019    CA 8.4 (L) 07/20/2019    OSMOCALC 289 07/20/2019    ALKPHO 70 07/20/2019    AST 16 07/20/2019    ALT 21 07/20/2019    BILT 0.3 07/20/2019    TP 7.5 07/20/2019    ALB 3.7 07/20/2019    GLOBULIN 3.8 07/20/2019     07/20/2019    K 4.4 07/20/2019     07/20/2019    CO2 27.0 07/20/2019     Lab Results   Component Value Date    MG 2.2 07/20/2019      Phosphorus (mg/dL)   Date Value   07/20/2019 3.4       Thyroid:    Lab Results   Component Value Date    TSH 1.650 07/20/2019       Iron Panel:  Lab Results   Component Value Date    IRON 48 (L) 07/20/2019       CBC:  Lab Results   Component Value Date    WBC 5.3 07/20/2019    WBC 5.1 04/02/2018     Lab Results   Component Value Date    HGB 11.4 (L) 07/20/2019    HGB 12.0 04/02/2018      Lab Results   Component Value Date    .0 07/20/2019     04/02/2018       Diabetes:    Lab Results   Component Value Date     07/20/2019    A1C 5.5 07/20/2019       Lipid Panel:  Lab Results   Component Value Date    CHOLEST 133 07/20/2019    TRIG 76 07/20/2019    HDL 47 07/20/2019    LDL 71 07/20/2019    VLDL 15 07/20/2019    NONHDLC 86 07/20/2019        Vitamins/Minerals:  Lab Results   Component Value Date    B12 521 07/20/2019     Lab Results   Component Value Date    VITD 16.4 (L) 07/20/2019     Lab Results   Component Value Date/Time     THIAMINE 108 07/20/2019 08:11 AM      No results found for: \"VITB1\"  Lab Results   Component Value Date/Time    FOLIC 14.1 07/20/2019 08:11 AM        Meds:     Current Outpatient Medications:     Phentermine HCl 15 MG Oral Cap, Take 1 capsule (15 mg total) by mouth every morning., Disp: 30 capsule, Rfl: 5    topiramate 25 MG Oral Tab, Take 1 tablet (25 mg total) by mouth every evening., Disp: 30 tablet, Rfl: 2    Social:  Household: , 4 daughters (28,24,24,22)  Occupation:      Number of Consults with [facility] RDN: 2  She previously met with Adele Leong RDN.  Surgeon: Yes - Dr. Yoo  Cardiologist: yes; approved  Pulmonologist: scheduled for December 3rd  Psychologist: yes - cleared     Weight Loss Medications: phentermine, topiramate    Bariatric Vitamins: Yes - but going to return them because they do not have the calcium - going to purchase Bariatric Choice     Patient started weight loss clinic on 4/15/19 with initial weight of 199 lbs 12.8 oz. Would like to reach a goal weight of 150 lbs. Today's Ht 4' 11.02\" (1.499 m)   Wt 216 lb (98 kg)   LMP 12/01/2023   BMI 43.60 kg/m²  indicating a 17 lbs weight gain since stating the [facility].     Weight:    Wt Readings from Last 6 Encounters:   08/27/24 218 lb (98.9 kg)   08/09/24 220 lb (99.8 kg)   06/28/24 229 lb (103.9 kg)   06/11/24 236 lb (107 kg)   03/13/24 224 lb (101.6 kg)   11/06/23 223 lb (101.2 kg)       Diet History:  Patient seen in clinic today for nutrition counseling to assist with weight loss. Today we reviewed her quizzes and signed the Commitment To Bariatric Surgery. Patient is interested in gastric sleeve. Her friend who was also seeing Dr. Slater and was interested in bariatric sx just found out her insurance doesn't cover the procedure. Patient attended the bariatric seminar. Reports certain challenges/barriers associated with weight loss. Specifically reports she has purchased bariatric utensils. She had purchased Bariatric vitamins but  when we looked at the label it did not have the calcium, so she is going to return them and purchase the Bariatric Choice vitamins. Her portions at meals are about 1/2 of what they were before and it takes her 30  minutes to eat a meal. Patient is taking small bites (nickel sized), is using a smaller spoon and is chewing each bite 30 x before swallowing. Patient takes small sips and is  her beverages from the meals by 15 minutes. Patient has eliminated caffeine, alcohol, and carbonated beverages. Patient has stopped using straws and chew gum. Historically she prefers meals over grazing, but her portions are larger than they need to be. She has a sweet tooth, but has been trying to eat more fruit to control her cravings. S/P cholecystectomy in 1996 with no malabsorption issues reported.She is allergic to shrimp. Discussed typical diet which consists of 2 meals and 1 snacks/day. She has started tracking her meals via MND, but not consistently. Today we configure her dashboard. She is aiming for <100 grams CHO/day. She has noticed a decrease in appetite and cravings since starting phentermine and topiramate. No adverse SE reported. Her and her  are responsible for grocery shopping and cooking. She is not eating out currently. No food log provided; verbal dietary recall listed below.     Typical Diet:  Breakfast: (9:00 am) Fair Life protein shake  Lunch: (11:00 am) protein with beans OR chicken and rice OR tuna salad with 4 crackers  Dinner: (5:00 - 5:30 pm) HB eggs with salsa and 1 tortilla OR steak/chorizo with beans OR fish soup with vegetables OR chicken flautas with sour cream and salsa OR \"mexican salad\" (chicken over shredded lettuce, tomatoes, salsa, sour cream)  Snack: (8:00 pm) greek yogurt OR peach OR oatmeal OR SKIPS      Beverages: water, Gatorade Zero  Alcoholic Beverages: N/A    Patient currently consumes caffeine: No  Patient currently consumes soda: No    Patient has made some  modifications and adjustments to diet: yes, see above  Food Allergies: Shrimp  Food intolerances: N/A  Vitamin/mineral supplements:  N/A  Protein supplements: Fair Life     Exercise:  Started walking around the block and walks more around her house. She is recovering from left foot sx.     Nutrition Diagnosis     Nutrition Diagnosis: Morbid obesity: Improvement shown     Intervention     Goals:  Keep a food log (My Net Diary, [employer]) as an effective tool to help track food choices  Focus on healthy plate; 1/2 plate vegetables, 1/4 plate protein, 1/4 plate carbohydrates  Avoid skipping meals - eat every 3-4 hours  Start each meal with protein first  Aim for  grams protein/day or 20-30 grams protein/meal  Quick convenient protein options: tuna, deli meat (turkey, chicken, roast beef), pre-cooked shrimp or chicken, greek yogurt, cottage cheese, protein shakes  When choosing yogurt aim for option consisting of 10-15 grams protein and  calories/serving  High protein yogurt examples: Siggi;s, Ugandan, Two Good, Oikos Triple Zero, Dannon light and fit greek, YQ by Yoplait, Fage, Chobani Less Sugar  Aim for <100 grams carbohydrates/day   Aim for 64 oz of water/day  Eat SLOWLY. Meal should take 20-30 minutes to even though you are eating very small amounts   Cut each bite into dime sized pieces  Chew each bite 20-30 x before swallowing  Do not eat and drink at the same time. Do not drink for 30 minutes before or after a meal  Avoid carbonation, straws, and chewing gum  Eliminate caffeine for at least 3 months and alcohol for at least 1 year after bariatric surgery  Take bariatric vitamins as recommended  Meal plan ahead of time  Use measuring cups and/or food scale for portion control  Purchase bariatric chewable MVI  Purchase pre-sx body wash  Purchase Ensure Pre-Surgery beverages  Start sampling different protein shake and trying the different s/p diet consistencies.    Exercise:  Stay active - focus on  realistic activities that fit into your schedule  Plan ahead of exercise - treat as an appointment  When getting started with exercise, slowly build up duration and intensity  Aim for 30 minutes or more of moderate to vigorous exercise 5 x week  Incorporate strength training 3 x week for at least 10 minutes    Behavior Modification:  Set small goals for self and focus on realistic, attainable changes to work on long-term.    Monitor/Evaluate     Anthropometric measurements, Food/fluid intake/choices, Food intolerances, Activity level, Vitamin/mineral supplementation, Reinforce goals, and Calorie/protein intake  Additional RD visits required to review concepts? yes  Patient has met:  Surgeon: Yes - Dr. Yoo  Cardiologist: yes; cleared  Pulmonologist: scheduled for December 3rd  Psychologist: yes; cleared on 8/26/24  Patient has completed the quizzed? Yes; 9/11/24  Patient understands protein requirements? yes  Patient understand fluid requirements (amount and method of intake)? yes  Patient understands post-operative diet? yes  Patient keeping consistent food records? yes  Patient has purchased bariatric vitamins: yes  Patient has purchased CHG body wash? no  Patient has purchased Pre-Surgery CHO beverage: yes  Patient ready for Liquid Protein Education? yes    Bertha Toussaint RDN, [place]  Visit Length: 8:50 am to 10:00 am - 70 minutes

## 2024-09-11 ENCOUNTER — OFFICE VISIT (OUTPATIENT)
Dept: SURGERY | Facility: CLINIC | Age: 54
End: 2024-09-11
Payer: COMMERCIAL

## 2024-09-11 VITALS — WEIGHT: 216 LBS | BODY MASS INDEX: 43.55 KG/M2 | HEIGHT: 59.02 IN

## 2024-09-11 DIAGNOSIS — G47.33 OSA (OBSTRUCTIVE SLEEP APNEA): ICD-10-CM

## 2024-09-11 DIAGNOSIS — R73.03 PRE-DIABETES: ICD-10-CM

## 2024-09-11 DIAGNOSIS — E66.01 OBESITY, CLASS III, BMI 40-49.9 (MORBID OBESITY) (HCC): Primary | ICD-10-CM

## 2024-09-11 PROCEDURE — 3008F BODY MASS INDEX DOCD: CPT | Performed by: DIETITIAN, REGISTERED

## 2024-09-11 PROCEDURE — 97803 MED NUTRITION INDIV SUBSEQ: CPT | Performed by: DIETITIAN, REGISTERED

## 2024-09-11 NOTE — PATIENT INSTRUCTIONS
Congratulations on your current weight loss success! Please reach out with any questions of concerns. Thank you for including me in your weight loss journey. Here's a review of today's visit:    Goals:  Keep a food log (My Net Diary, [employer]) as an effective tool to help track food choices  Focus on healthy plate; 1/2 plate vegetables, 1/4 plate protein, 1/4 plate carbohydrates  Avoid skipping meals - eat every 3-4 hours  Start each meal with protein first  Aim for  grams protein/day or 20-30 grams protein/meal  Quick convenient protein options: tuna, deli meat (turkey, chicken, roast beef), pre-cooked shrimp or chicken, greek yogurt, cottage cheese, protein shakes  When choosing yogurt aim for option consisting of 10-15 grams protein and  calories/serving  High protein yogurt examples: Siggi;s, Azeri, Two Good, Oikos Triple Zero, Dannon light and fit greek, YQ by Yoplait, Fage, Chobani Less Sugar  Aim for <100 grams carbohydrates/day   Aim for 64 oz of water/day  Eat SLOWLY. Meal should take 20-30 minutes to even though you are eating very small amounts   Cut each bite into dime sized pieces  Chew each bite 20-30 x before swallowing  Do not eat and drink at the same time. Do not drink for 30 minutes before or after a meal  Avoid carbonation, straws, and chewing gum  Eliminate caffeine for at least 3 months and alcohol for at least 1 year after bariatric surgery  Take bariatric vitamins as recommended  Meal plan ahead of time  Use measuring cups and/or food scale for portion control  Purchase bariatric chewable MVI  Purchase pre-sx body wash  Purchase Ensure Pre-Surgery beverages  Start sampling different protein shake and trying the different s/p diet consistencies.    Exercise:  Stay active - focus on realistic activities that fit into your schedule  Plan ahead of exercise - treat as an appointment  When getting started with exercise, slowly build up duration and intensity  Aim for 30 minutes or  more of moderate to vigorous exercise 5 x week  Incorporate strength training 3 x week for at least 10 minutes    Behavior Modification:  Set small goals for self and focus on realistic, attainable changes to work on long-term.

## 2024-09-16 ENCOUNTER — TELEPHONE (OUTPATIENT)
Dept: SURGERY | Facility: CLINIC | Age: 54
End: 2024-09-16

## 2024-09-24 ENCOUNTER — OFFICE VISIT (OUTPATIENT)
Dept: SURGERY | Facility: CLINIC | Age: 54
End: 2024-09-24
Payer: COMMERCIAL

## 2024-09-24 ENCOUNTER — TELEPHONE (OUTPATIENT)
Dept: SURGERY | Facility: CLINIC | Age: 54
End: 2024-09-24

## 2024-09-24 ENCOUNTER — DOCUMENTATION ONLY (OUTPATIENT)
Dept: SURGERY | Facility: CLINIC | Age: 54
End: 2024-09-24

## 2024-09-24 VITALS
HEIGHT: 59 IN | SYSTOLIC BLOOD PRESSURE: 120 MMHG | WEIGHT: 216 LBS | BODY MASS INDEX: 43.55 KG/M2 | DIASTOLIC BLOOD PRESSURE: 82 MMHG | OXYGEN SATURATION: 100 % | HEART RATE: 98 BPM

## 2024-09-24 NOTE — PROGRESS NOTES
[facility], [facility], [place]  [address]  [facility] [address]  Dept: [phone]    9/24/2024    BARIATRIC EXISTING PATIENT/FOLLOW UP    HPI:  Parvin Vargas is a 54 year old-year old female who presents for continued follow-up prior to bariatric surgery.  She is interested in sleeve gastrectomy and she continues to lose weight.  She is down another 2 pounds since I last saw her.  Today we went over her workup thus far including the other doctors that she has seen.  We also discussed the operation in detail she has good recall of the details of a sleeve gastrectomy.  I went over the anatomic drawings to again illustrate the operation in detail.  We discussed the importance of lifestyle modification including her diet.  I went over specific diet instructions including the liquid diet preoperatively as well as high-protein diet both now as well as after surgery providing examples of high-protein foods.  She has good recollection not to eat much in the way of bread or tortillas.  Apparently her pulmonology appointments kept getting changed and was pushed off all the way to December, today we were able to arrange for her to see a pulmonologist on November 13. All of our conversations were held in Macedonian    PCP Dr Man Noble Bryon, phentermine, topirimate  Bar Bertha, ready liquid  Cards renee Torres Kosta 11/13  Psych renee Bolton     PMH Denies, possible sleep apnea  PSH C section x2, lap jayme, nasal surgery, left foot surgery  Meds Phentermine, topirimate    PHYSICAL EXAM:    Vital Signs:  /82   Pulse 98   Ht 4' 11\" (1.499 m)   Wt 216 lb (98 kg)   LMP 12/01/2023   SpO2 100%   BMI 43.63 kg/m²   BMI:  Body mass index is 43.63 kg/m².  Patient looks good she moves easily breathing is unlabored pulse normal abdomen soft    ASSESSMENT:  Morbid obesity interested in sleeve gastrectomy she is going to see the pulmonologist in early  November follow-up with me afterwards and hopefully we can schedule her for her surgery in December    Plan: Plan as above    Jordan Moon MD  9/24/2024

## 2024-09-24 NOTE — PROGRESS NOTES
Informed pt, we were able to get an earlier appt w/ Dr Brambila pulmonologist for 11/13/24 at 9:30 A; [address], park in orange parking lot; pt thankful, agreed and understood.

## 2024-09-25 ENCOUNTER — OFFICE VISIT (OUTPATIENT)
Dept: SURGERY | Facility: CLINIC | Age: 54
End: 2024-09-25
Payer: COMMERCIAL

## 2024-09-25 VITALS
DIASTOLIC BLOOD PRESSURE: 74 MMHG | BODY MASS INDEX: 43.75 KG/M2 | OXYGEN SATURATION: 100 % | SYSTOLIC BLOOD PRESSURE: 120 MMHG | WEIGHT: 217 LBS | HEART RATE: 71 BPM | HEIGHT: 59 IN

## 2024-09-25 DIAGNOSIS — G47.33 OSA (OBSTRUCTIVE SLEEP APNEA): ICD-10-CM

## 2024-09-25 DIAGNOSIS — E55.9 VITAMIN D DEFICIENCY: ICD-10-CM

## 2024-09-25 DIAGNOSIS — R73.03 PRE-DIABETES: Primary | ICD-10-CM

## 2024-09-25 DIAGNOSIS — R73.09 ABNORMAL BLOOD SUGAR: ICD-10-CM

## 2024-09-25 DIAGNOSIS — E66.01 OBESITY, CLASS III, BMI 40-49.9 (MORBID OBESITY) (HCC): ICD-10-CM

## 2024-09-25 DIAGNOSIS — Z51.81 ENCOUNTER FOR THERAPEUTIC DRUG MONITORING: ICD-10-CM

## 2024-09-25 PROBLEM — E66.813 OBESITY, CLASS III, BMI 40-49.9 (MORBID OBESITY): Status: ACTIVE | Noted: 2019-04-15

## 2024-09-25 PROCEDURE — 3074F SYST BP LT 130 MM HG: CPT | Performed by: INTERNAL MEDICINE

## 2024-09-25 PROCEDURE — 3078F DIAST BP <80 MM HG: CPT | Performed by: INTERNAL MEDICINE

## 2024-09-25 PROCEDURE — 99214 OFFICE O/P EST MOD 30 MIN: CPT | Performed by: INTERNAL MEDICINE

## 2024-09-25 PROCEDURE — 3008F BODY MASS INDEX DOCD: CPT | Performed by: INTERNAL MEDICINE

## 2024-09-25 NOTE — PROGRESS NOTES
[facility], [facility], [employer]  [address]  [facility] [address]  Dept: [phone]       Patient:  Parvin Vargas  :      1970  MRN:      [id number]    Chief Complaint:    Chief Complaint   Patient presents with    Follow - Up    Weight Management       SUBJECTIVE     History of Present Illness:  Parvin is being seen today for a follow-up for pre surgical weight loss    Past Medical History:   Past Medical History:    Morbid obesity with BMI of 40.0-44.9, adult (HCC)    ARLETTE (obstructive sleep apnea)    Sleep apnea    Visual impairment    READERS        Comorbidities:  SOB/LOYD-Improvement?  yes, Back pain-Improvement?  yes and Sleep apnea-Improvement?  yes    OBJECTIVE     Vitals: /74   Pulse 71   Ht 4' 11\" (1.499 m)   Wt 217 lb (98.4 kg)   LMP 2023   SpO2 100%   BMI 43.83 kg/m²     Initial weight loss: -19   Total weight loss: +18   Start weight: 199    Wt Readings from Last 3 Encounters:   24 217 lb (98.4 kg)   24 216 lb (98 kg)   24 216 lb (98 kg)       Patient Medications:    Current Outpatient Medications   Medication Sig Dispense Refill    Phentermine HCl 15 MG Oral Cap Take 1 capsule (15 mg total) by mouth every morning. 30 capsule 5    topiramate 25 MG Oral Tab Take 1 tablet (25 mg total) by mouth every evening. 30 tablet 2     Allergies:  Shrimp and Daypro [oxaprozin]     Social History:    Social History     Socioeconomic History    Marital status:      Spouse name: Not on file    Number of children: Not on file    Years of education: Not on file    Highest education level: Not on file   Occupational History    Not on file   Tobacco Use    Smoking status: Never    Smokeless tobacco: Never   Substance and Sexual Activity    Alcohol use: Never    Drug use: Never    Sexual activity: Not on file   Other Topics Concern    Not on file   Social History Narrative    Not on file     Social  Determinants of Health     Financial Resource Strain: Not on file   Food Insecurity: Not on file   Transportation Needs: Not on file   Physical Activity: Not on file   Stress: Not on file   Social Connections: Not on file   Housing Stability: Not on file     Surgical History:    Past Surgical History:   Procedure Laterality Date    Cholecystectomy  1996    Prior classical       Sinus surgery         Family History:  History reviewed. No pertinent family history.    Food Journal  Reviewed and Discussed:       Patient has a Food Journal?: yes   Patient is reading nutrition labels?  yes  Average Caloric Intake:     Average CHO Intake: 120  Is patient exercising? yes  Type of exercise? walking    Eating Habits  Patient states the following:  Eats 3 meal(s) per day  Length of time it takes to consume a meal:  20  # of snacks per day: 1 Type of snacks:  fruit  Amount of soda consumption per day:  regular  Amount of water (in ounces) per day:  64  Drinking between meals only:  yes  Toughest challenge:  soda    Nutritional Goals  Limit carbohydrates to 100 gms per day, Eat 100-200 calories within 1 hour of waking  and Eat 3-4 cups of fresh fruits or vegetables daily    Behavior Modifications Reviewed and Discussed  Plan meals in advance, Read nutrition labels, Drink 64 oz of water per day, Maintain a daily food journal, No drinking 30 minutes before or after meals, Utlize portion control strategies to reduce calorie intake, Identify triggers for eating and manage cues and Eat slowly and take 20 to 30 minutes to complete each meal    Exercise Goals Reviewed and Discussed    Keep walking    ROS:    Constitutional: positive for fatigue  Respiratory: negative  Cardiovascular: negative  Gastrointestinal: negative  Musculoskeletal:negative  Neurological: negative  Behavioral/Psych: negative  Endocrine: negative  All other systems were reviewed and are negative    Physical Exam:   General appearance: alert, appears stated  age, cooperative and moderately obese  Head: Normocephalic, without obvious abnormality, atraumatic  Eyes: conjunctivae/corneas clear. PERRL, EOM's intact. Fundi benign.  Back: symmetric, no curvature. ROM normal. No CVA tenderness.  Lungs: clear to auscultation bilaterally  Heart: S1, S2 normal, no murmur, click, rub or gallop, regular rate and rhythm  Abdomen: soft, non-tender; bowel sounds normal; no masses,  no organomegaly  Extremities: extremities normal, atraumatic, no cyanosis or edema  Pulses: 2+ and symmetric  Skin: Skin color, texture, turgor normal. No rashes or lesions    ASSESSMENT       Encounter Diagnosis(ses):   Encounter Diagnoses   Name Primary?    Pre-diabetes Yes    ARLETTE (obstructive sleep apnea)     Obesity, Class III, BMI 40-49.9 (morbid obesity) (HCC)     Vitamin D deficiency     Encounter for therapeutic drug monitoring     Abnormal blood sugar        PLAN     Discussed with patient the risks and benefits of RYGBP and VSG. The patient is interested in bariatric surgery and has begun our presurgical process.       Fatigue: should improve with weight loss    OBSTRUCTIVE SLEEP APNEA: Given the patient's history suggestive of obstructive sleep apnea as outlined above, consideration for obtaining a sleep study may be warranted.  Further consideration for obtaining the sleep study will be discussed with the patient's PCP.    Goals for next month:  1. Keep a food log.  2. Drink 48-64 ounces of non-caloric beverages per day. No fruit juices or regular soda.  3. Increase activity-upper body exercises, walk 10 minutes per day.  4. Increase fruit and vegetable servings to 5-6 per day.      Attended seminar    Met with Dr Moon  Met with RD  Met with Psych  Met with cardiology  Scheduled to see pulmonary     PHENTERMINE: refill at 15 mg  Needs new EKG    Topiramate: 25 mg every evening for caravings    Will need bariaric labs      Diagnoses and all orders for this visit:    Pre-diabetes  -     Comp  Metabolic Panel (14); Future  -     Vitamin B12; Future  -     Folic Acid Serum (Folate); Future  -     Vitamin D, 25-Hydroxy; Future  -     Vitamin B1 (Thiamine), Blood; Future  -     Iron And Tibc; Future  -     Ferritin; Future  -     Lipid Panel; Future  -     Thyroid Stim Hormone, assay; Future  -     Hemoglobin A1C; Future  -     CBC, Platelet; No Differential; Future    ARLETTE (obstructive sleep apnea)  -     Comp Metabolic Panel (14); Future  -     Vitamin B12; Future  -     Folic Acid Serum (Folate); Future  -     Vitamin D, 25-Hydroxy; Future  -     Vitamin B1 (Thiamine), Blood; Future  -     Iron And Tibc; Future  -     Ferritin; Future  -     Lipid Panel; Future  -     Thyroid Stim Hormone, assay; Future  -     Hemoglobin A1C; Future  -     CBC, Platelet; No Differential; Future    Obesity, Class III, BMI 40-49.9 (morbid obesity) (HCC)  -     Comp Metabolic Panel (14); Future  -     Vitamin B12; Future  -     Folic Acid Serum (Folate); Future  -     Vitamin D, 25-Hydroxy; Future  -     Vitamin B1 (Thiamine), Blood; Future  -     Iron And Tibc; Future  -     Ferritin; Future  -     Lipid Panel; Future  -     Thyroid Stim Hormone, assay; Future  -     Hemoglobin A1C; Future  -     CBC, Platelet; No Differential; Future    Vitamin D deficiency  -     Comp Metabolic Panel (14); Future  -     Vitamin B12; Future  -     Folic Acid Serum (Folate); Future  -     Vitamin D, 25-Hydroxy; Future  -     Vitamin B1 (Thiamine), Blood; Future  -     Iron And Tibc; Future  -     Ferritin; Future  -     Lipid Panel; Future  -     Thyroid Stim Hormone, assay; Future  -     Hemoglobin A1C; Future  -     CBC, Platelet; No Differential; Future    Encounter for therapeutic drug monitoring  -     Comp Metabolic Panel (14); Future  -     Vitamin B12; Future  -     Folic Acid Serum (Folate); Future  -     Vitamin D, 25-Hydroxy; Future  -     Vitamin B1 (Thiamine), Blood; Future  -     Iron And Tibc; Future  -     Ferritin; Future  -      Lipid Panel; Future  -     Thyroid Stim Hormone, assay; Future  -     Hemoglobin A1C; Future  -     CBC, Platelet; No Differential; Future    Abnormal blood sugar  -     Comp Metabolic Panel (14); Future  -     Vitamin B12; Future  -     Folic Acid Serum (Folate); Future  -     Vitamin D, 25-Hydroxy; Future  -     Vitamin B1 (Thiamine), Blood; Future  -     Iron And Tibc; Future  -     Ferritin; Future  -     Lipid Panel; Future  -     Thyroid Stim Hormone, assay; Future  -     Hemoglobin A1C; Future  -     CBC, Platelet; No Differential; Future          Aba Slater MD

## 2024-09-26 ENCOUNTER — LAB ENCOUNTER (OUTPATIENT)
Dept: LAB | Facility: HOSPITAL | Age: 54
End: 2024-09-26
Attending: INTERNAL MEDICINE
Payer: COMMERCIAL

## 2024-09-26 DIAGNOSIS — Z51.81 ENCOUNTER FOR THERAPEUTIC DRUG MONITORING: ICD-10-CM

## 2024-09-26 DIAGNOSIS — E66.01 OBESITY, CLASS III, BMI 40-49.9 (MORBID OBESITY) (HCC): ICD-10-CM

## 2024-09-26 DIAGNOSIS — R73.09 ABNORMAL BLOOD SUGAR: ICD-10-CM

## 2024-09-26 DIAGNOSIS — R73.03 PRE-DIABETES: ICD-10-CM

## 2024-09-26 DIAGNOSIS — G47.33 OSA (OBSTRUCTIVE SLEEP APNEA): ICD-10-CM

## 2024-09-26 DIAGNOSIS — E55.9 VITAMIN D DEFICIENCY: Primary | ICD-10-CM

## 2024-09-26 DIAGNOSIS — E55.9 VITAMIN D DEFICIENCY: ICD-10-CM

## 2024-09-26 LAB
ALBUMIN SERPL-MCNC: 4.4 G/DL (ref 3.2–4.8)
ALBUMIN/GLOB SERPL: 1.4 {RATIO} (ref 1–2)
ALP LIVER SERPL-CCNC: 85 U/L
ALT SERPL-CCNC: 57 U/L
ANION GAP SERPL CALC-SCNC: 9 MMOL/L (ref 0–18)
AST SERPL-CCNC: 59 U/L (ref ?–34)
BILIRUB SERPL-MCNC: 0.7 MG/DL (ref 0.3–1.2)
BUN BLD-MCNC: 13 MG/DL (ref 9–23)
BUN/CREAT SERPL: 20.6 (ref 10–20)
CALCIUM BLD-MCNC: 9.5 MG/DL (ref 8.7–10.4)
CHLORIDE SERPL-SCNC: 105 MMOL/L (ref 98–112)
CHOLEST SERPL-MCNC: 130 MG/DL (ref ?–200)
CO2 SERPL-SCNC: 27 MMOL/L (ref 21–32)
CREAT BLD-MCNC: 0.63 MG/DL
DEPRECATED HBV CORE AB SER IA-ACNC: 33 NG/ML
DEPRECATED RDW RBC AUTO: 41.2 FL (ref 35.1–46.3)
EGFRCR SERPLBLD CKD-EPI 2021: 107 ML/MIN/1.73M2 (ref 60–?)
ERYTHROCYTE [DISTWIDTH] IN BLOOD BY AUTOMATED COUNT: 12.8 % (ref 11–15)
EST. AVERAGE GLUCOSE BLD GHB EST-MCNC: 108 MG/DL (ref 68–126)
FASTING PATIENT LIPID ANSWER: YES
FASTING STATUS PATIENT QL REPORTED: YES
FOLATE SERPL-MCNC: 16.5 NG/ML (ref 5.4–?)
GLOBULIN PLAS-MCNC: 3.2 G/DL (ref 2–3.5)
GLUCOSE BLD-MCNC: 94 MG/DL (ref 70–99)
HBA1C MFR BLD: 5.4 % (ref ?–5.7)
HCT VFR BLD AUTO: 37.8 %
HDLC SERPL-MCNC: 44 MG/DL (ref 40–59)
HGB BLD-MCNC: 12.5 G/DL
IRON SATN MFR SERPL: 22 %
IRON SERPL-MCNC: 105 UG/DL
LDLC SERPL CALC-MCNC: 73 MG/DL (ref ?–100)
MCH RBC QN AUTO: 29.2 PG (ref 26–34)
MCHC RBC AUTO-ENTMCNC: 33.1 G/DL (ref 31–37)
MCV RBC AUTO: 88.3 FL
NONHDLC SERPL-MCNC: 86 MG/DL (ref ?–130)
OSMOLALITY SERPL CALC.SUM OF ELEC: 292 MOSM/KG (ref 275–295)
PLATELET # BLD AUTO: 177 10(3)UL (ref 150–450)
POTASSIUM SERPL-SCNC: 4 MMOL/L (ref 3.5–5.1)
PROT SERPL-MCNC: 7.6 G/DL (ref 5.7–8.2)
RBC # BLD AUTO: 4.28 X10(6)UL
SODIUM SERPL-SCNC: 141 MMOL/L (ref 136–145)
TIBC SERPL-MCNC: 486 UG/DL (ref 250–425)
TRANSFERRIN SERPL-MCNC: 326 MG/DL (ref 250–380)
TRIGL SERPL-MCNC: 60 MG/DL (ref 30–149)
TSI SER-ACNC: 2.92 MIU/ML (ref 0.55–4.78)
VIT B12 SERPL-MCNC: 511 PG/ML (ref 211–911)
VIT D+METAB SERPL-MCNC: 14 NG/ML (ref 30–100)
VLDLC SERPL CALC-MCNC: 9 MG/DL (ref 0–30)
WBC # BLD AUTO: 5.5 X10(3) UL (ref 4–11)

## 2024-09-26 PROCEDURE — 80061 LIPID PANEL: CPT | Performed by: INTERNAL MEDICINE

## 2024-09-26 PROCEDURE — 82306 VITAMIN D 25 HYDROXY: CPT | Performed by: INTERNAL MEDICINE

## 2024-09-26 PROCEDURE — 82746 ASSAY OF FOLIC ACID SERUM: CPT | Performed by: INTERNAL MEDICINE

## 2024-09-26 PROCEDURE — 84425 ASSAY OF VITAMIN B-1: CPT | Performed by: INTERNAL MEDICINE

## 2024-09-26 PROCEDURE — 80050 GENERAL HEALTH PANEL: CPT | Performed by: INTERNAL MEDICINE

## 2024-09-26 PROCEDURE — 82607 VITAMIN B-12: CPT | Performed by: INTERNAL MEDICINE

## 2024-09-26 PROCEDURE — 83540 ASSAY OF IRON: CPT | Performed by: INTERNAL MEDICINE

## 2024-09-26 PROCEDURE — 83036 HEMOGLOBIN GLYCOSYLATED A1C: CPT | Performed by: INTERNAL MEDICINE

## 2024-09-26 PROCEDURE — 84466 ASSAY OF TRANSFERRIN: CPT | Performed by: INTERNAL MEDICINE

## 2024-09-26 PROCEDURE — 82728 ASSAY OF FERRITIN: CPT | Performed by: INTERNAL MEDICINE

## 2024-09-26 RX ORDER — ERGOCALCIFEROL 1.25 MG/1
50000 CAPSULE, LIQUID FILLED ORAL WEEKLY
Qty: 12 CAPSULE | Refills: 5 | Status: SHIPPED | OUTPATIENT
Start: 2024-09-26 | End: 2024-12-13

## 2024-09-30 LAB — VITAMIN B1 WHOLE BLD: 99.5 NMOL/L

## 2024-10-09 NOTE — PROGRESS NOTES
[facility] BARIATRIC AND WEIGHT LOSS CLINIC  [address]  [address]  [facility] [address]  Dept: [phone]  Loc: [phone]    10/09/24  Bariatric Liquid Protein Nutrition Session  Parvin Vargas is a 54 year old female    Assessment   Procedure:  Vertical Sleeve Gastrectomy scheduled on TBD with Dr. Yoo  Medical Diagnosis:  morbidly obese    Lab Results:  Lab Results   Component Value Date    GLU 94 09/26/2024    BUN 13 09/26/2024    BUNCREA 20.6 (H) 09/26/2024    CREATSERUM 0.63 09/26/2024    ANIONGAP 9 09/26/2024    GFRNAA 100 07/20/2019    GFRAA 115 07/20/2019    CA 9.5 09/26/2024    OSMOCALC 292 09/26/2024    ALKPHO 85 09/26/2024    AST 59 (H) 09/26/2024    ALT 57 (H) 09/26/2024    BILT 0.7 09/26/2024    TP 7.6 09/26/2024    ALB 4.4 09/26/2024    GLOBULIN 3.2 09/26/2024     09/26/2024    K 4.0 09/26/2024     09/26/2024    CO2 27.0 09/26/2024       Lab Results   Component Value Date    MG 2.2 07/20/2019     Phosphorus (mg/dL)   Date Value   07/20/2019 3.4        Thyroid:    Lab Results   Component Value Date    TSH 2.919 09/26/2024    TSH 1.650 07/20/2019       Iron Panel:  Lab Results   Component Value Date    IRON 105 09/26/2024    TRANSFERRIN 326 09/26/2024    TIBCP 486 (H) 09/26/2024    SAT 22 09/26/2024       CBC:  Lab Results   Component Value Date    WBC 5.5 09/26/2024    WBC 5.3 07/20/2019    WBC 5.1 04/02/2018     Lab Results   Component Value Date    HGB 12.5 09/26/2024    HGB 11.4 (L) 07/20/2019    HGB 12.0 04/02/2018      Lab Results   Component Value Date    .0 09/26/2024    .0 07/20/2019     04/02/2018        Diabetes:    HGBA1C:    Lab Results   Component Value Date    A1C 5.4 09/26/2024    A1C 5.5 07/20/2019     09/26/2024       Lipid Panel:  Lab Results   Component Value Date    CHOLEST 130 09/26/2024    TRIG 60 09/26/2024    HDL 44 09/26/2024    LDL 73 09/26/2024    VLDL 9 09/26/2024    NONHDLC 86 09/26/2024        Vitamins/Minerals:  Lab Results   Component Value Date    B12 511 09/26/2024     Lab Results   Component Value Date    VITD 14.0 (L) 09/26/2024     Lab Results   Component Value Date/Time    THIAMINE 108 07/20/2019 08:11 AM      Lab Results   Component Value Date/Time    VITB1 99.5 09/26/2024 08:29 AM     Lab Results   Component Value Date/Time    FOLIC 16.5 09/26/2024 08:29 AM       SSocial:  Household: , 4 daughters (28,24,24,22)  Occupation:     Weight Loss Medications: phentermine, topiramate   Bariatric Vitamins:  Bariatric Choice    Protein Supplement: none yet    Number of visits with Bertha Toussaint RDN: 4  She previously met with Adele Leong RDN.  Surgeon: Yes - Dr. Yoo  Cardiologist: yes; approved  Pulmonologist: scheduled for November 13th  Psychologist: yes - cleared    Patient started weight loss clinic on 4/15/19 with initial weight of 199 lbs 12.8 oz. Restarted the WCL on 6/11/24 with a weight of 236 lbs. Would like to reach a goal weight of 150 lbs. Today's Ht 4' 11.02\" (1.499 m)   Wt 218 lb (98.9 kg)   LMP 12/01/2023   BMI 44.01 kg/m²  indicating a 19 lbs weight gain since initially starting the WLC, but lost 37 lbs since June.     Wt Readings from Last 6 Encounters:   09/25/24 217 lb (98.4 kg)   09/24/24 216 lb (98 kg)   09/11/24 216 lb (98 kg)   08/27/24 218 lb (98.9 kg)   08/09/24 220 lb (99.8 kg)   06/28/24 229 lb (103.9 kg)       Diet History:  Patient seen in clinic today for nutrition counseling to review the Bariatric Liquid Protein Diet. Patient is scheduled for VSG with Dr. Yoo on TBD.  Her friend who was also seeing Dr. Slater and was interested in bariatric sx just found out her insurance doesn't cover the procedure. Patient attended the bariatric seminar. Reports certain challenges/barriers associated with weight loss. Specifically reports she has purchased bariatric utensils. She had purchased Bariatric vitamins but when we looked at the label it did not have  the calcium, so she returned them and purchased the Bariatric Choice vitamins. Her portions at meals are about 1/2 of what they were before and it takes her 45 minutes to eat a meal since starting to use the smaller utensils. Patient is taking small bites (dime sized), is using a smaller spoon and is chewing each bite 30 x before swallowing. Patient takes small sips and is  her beverages from the meals by 15 minutes. Patient has eliminated caffeine, alcohol, and carbonated beverages. Patient has stopped using straws and chew gum. Historically she prefers meals over grazing, but her portions are larger than they need to be. She has a sweet tooth, but has been trying to eat more fruit to control her cravings. S/P cholecystectomy in 1996 with no malabsorption issues reported. She is allergic to shrimp. Labs from 9/26/24 indicate fatty liver (ALT: 57/AST:59). Discussed typical diet which consists of 2 meals and 1 snacks/day. She has started tracking her meals via MND, but not consistently. Today we configure her dashboard. She is aiming for <100 grams CHO/day. She has noticed a decrease in appetite and cravings since starting phentermine and topiramate. No adverse SE reported. Her and her  are responsible for grocery shopping and cooking. She is not eating out currently. No food log provided; verbal dietary recall listed below.     Typical Diet:  Breakfast: (9:00 am) cereal (Total) OR oatmeal OR Fair Life protein shake  Lunch: (11:00 am) HB eggs OR protein(chicken/cheese) with beans OR chicken and rice OR flautus with cheese, and tomatoes  Dinner: (5:00 - 5:30 pm) beans OR Protein shake OR HB eggs with salsa and 1 tortilla OR steak/chorizo with beans OR fish soup with vegetables OR chicken flautas with sour cream and salsa OR \"mexican salad\" (chicken over shredded lettuce, tomatoes, salsa, sour cream)  Snack: SKIPS      Beverages: water, Gatorade Zero  Alcoholic Beverages: N/A     Per 7 day MND  Review:  Kcal: 1509 calories/day  Carb: 105 grams/day  Protein: 30 grams/day  Fluid:  48 + ounces/day    Excessive in: nothing  Inadequate in:  nothing  Patient has made some modifications and adjustments to diet: yes, see above  Food Allergies: Shrimp  Food intolerances: N/A  Vitamin/mineral supplements:  N/A  Protein supplements: Fair Life      Exercise:  Started walking around the block and walks more around her house. She is recovering from left foot sx.     Nutrition Diagnosis: Morbid obesity: Improvement shown    Intervention   Interventions: Counseled for liquid protein diet  Reviewed ERAS requirements.  Provided CHG bath and carb loading handouts.  Provided/reviewed handout on pain control after bariatric surgery.    Recommendations/goals:   Keep it going goals:   Continue to keep a food record, My Net Diary, select the macros dashboard.  Continue to consume at least 4-6 meals/snacks per day:  Include protein and produce when you eat.    Aim for 80-90 grams of protein per day.   Try to keep the carbohydrates at 100 grams per day or less.    Continue to practice eating strategies:  Eat separately from drinking  Avoid straws  Chew food 20-30 times before swallowing  Make the meals last 30 minutes.  Aim for 64 oz per day of water.  (Try Protein water, adding True Lemon, Crystal Light).  Continue to exercise with a goal of 30 minutes per day for exercise (for example,walking).    Continue to strength train within your means 10 minutes three days per week.      Work in progress goals:   1.  Line up your protein supplements for liquid protein.  2.  Buy 1 bottle of CHG soap.  3.  Buy the liquid or dissolvable Tylenol.    Liquid Protein Diet Check List  ?  Begin the liquid protein diet two weeks prior to surgery, see page 55 or 57 in binder for guidelines. You may begin drinking more shakes and eating less food the weekend BEFORE you begin the liquid protein diet.  ? You may begin taking a fiber supplement such as  Benefiber when beginning liquid protein diet.     ? Continue to drink at least 64 ounces of stage 1 liquids per day.  ?  If you have a diagnosis of Diabetes, look out for symptoms of low blood sugars.  ?  Expect two phone calls in the two weeks leading up to your surgery:        1. The hospital registrar- to get you registered.              2.  Same day surgery department- to be given instructions on what time and where to check-in, what to bring, etc.  ?  Purchase the Ensure Pre-Surgery drink (2 bottles)             1.  Drink one bottle 12 hours before surgery.             2.  Drink the second bottle -4 hours before surgery.  ?  Purchase the CHG soap (1 bottle) and use TWICE            1.  Half a bottle after showering the night before surgery.            2.  The rest of the bottle after showering again the morning of surgery.   ?  Purchase CHEWABLE bariatric vitamins (Bariatric Choice)     Begin taking the weekend AFTER your surgery.  While in the Hospital  ?  Aim to drink 2-4 ounces of water per hour.  ?  Use caution with cold beverages as they can cause stomach cramping.  ? If you order broth it may be served at room temperature because protein powder is added to it. (The protein powder will coagulate if the broth is hot).  ?  Log your fluid intake on the “My Fluid Intake” sheet provided by your nurse to track number of medicine cup or ounce of fluid drank for the day.  While at home  ?  Take pain meds as instructed- wean off Oxycodone to liquid or dissolve packs Tylenol.  ?  Follow the diet as prescribed (stage 1 clear liquids for 3 days, stages 2-4 for 1 week each).  ?  WALK around your home at least 1 x per hour to prevent blood clots and to promote healing.  ?  Look out for signs of an infection, call the surgeons office if there is a concern.  ?  If you are constipated, do not take any stool softeners until the weekend AFTER your surgery.  ?  Follow-up with the surgeon 1-2 weeks after surgery.  ?  Follow-up  with the bariatrician and dietitian 2-6 weeks after surgery.      Monitor/Evaluate   Anthropometric measurements, Food/fluid intake/choices, Food intolerances, Activity level, Vitamin/mineral supplementation, Reinforce goals, and Calorie/protein intake  Additional RD visits required to review concepts? no  Patient has met:  Surgeon: Yes - Dr. Yoo  Cardiologist: yes; approved  Pulmonologist: scheduled for November 13th  Psychologist: yes - cleared  Patient has completed the quizzed? Yes - 9/11/24  Patient understands protein requirements? yes  Patient understand fluid requirements (amount and method of intake)? yes  Patient understands post-operative diet? yes  Patient keeping consistent food records? Tracking, but inconsistent  Patient has purchased bariatric vitamins: yes  Patient has purchased CHG body wash? Purchasing today  Patient has purchased Pre-Surgery CHO beverage: yes  Patient with RD clearance for surgery? yes      Bertha Toussaint RDN, [place]  Visit Length: 8:46 am to 10:24 - 98 minutes

## 2024-10-10 ENCOUNTER — OFFICE VISIT (OUTPATIENT)
Dept: SURGERY | Facility: CLINIC | Age: 54
End: 2024-10-10
Payer: COMMERCIAL

## 2024-10-10 VITALS — WEIGHT: 218 LBS | BODY MASS INDEX: 43.95 KG/M2 | HEIGHT: 59.02 IN

## 2024-10-10 DIAGNOSIS — R73.03 PRE-DIABETES: ICD-10-CM

## 2024-10-10 DIAGNOSIS — E66.01 OBESITY, CLASS III, BMI 40-49.9 (MORBID OBESITY) (HCC): Primary | ICD-10-CM

## 2024-10-10 DIAGNOSIS — G47.33 OSA (OBSTRUCTIVE SLEEP APNEA): ICD-10-CM

## 2024-10-10 PROCEDURE — 3008F BODY MASS INDEX DOCD: CPT | Performed by: DIETITIAN, REGISTERED

## 2024-10-10 PROCEDURE — 97803 MED NUTRITION INDIV SUBSEQ: CPT | Performed by: DIETITIAN, REGISTERED

## 2024-10-10 NOTE — PATIENT INSTRUCTIONS
Congratulations on your current weight loss success! Please reach out with any questions of concerns. Thank you for including me in your weight loss journey. Here's a review of today's visit:    Recommendations/goals:   Keep it going goals:   Continue to keep a food record, My Net Diary, select the macros dashboard.  Continue to consume at least 4-6 meals/snacks per day:  Include protein and produce when you eat.    Aim for 80-90 grams of protein per day.   Try to keep the carbohydrates at 100 grams per day or less.    Continue to practice eating strategies:  Eat separately from drinking  Avoid straws  Chew food 20-30 times before swallowing  Make the meals last 30 minutes.  Aim for 64 oz per day of water.  (Try Protein water, adding True Lemon, Crystal Light).  Continue to exercise with a goal of 30 minutes per day for exercise (for example,walking).    Continue to strength train within your means 10 minutes three days per week.      Work in progress goals:   1.  Line up your protein supplements for liquid protein.  2.  Buy 1 bottle of CHG soap.  3.  Buy the liquid or dissolvable Tylenol.    Liquid Protein Diet Check List  ?  Begin the liquid protein diet two weeks prior to surgery, see page 55 or 57 in binder for guidelines. You may begin drinking more shakes and eating less food the weekend BEFORE you begin the liquid protein diet.  ? You may begin taking a fiber supplement such as Benefiber when beginning liquid protein diet.     ? Continue to drink at least 64 ounces of stage 1 liquids per day.  ?  If you have a diagnosis of Diabetes, look out for symptoms of low blood sugars.  ?  Expect two phone calls in the two weeks leading up to your surgery:        1. The hospital registrar- to get you registered.              2.  Same day surgery department- to be given instructions on what time and where to check-in, what to bring, etc.  ?  Purchase the Ensure Pre-Surgery drink (2 bottles)             1.  Drink one  bottle 12 hours before surgery.             2.  Drink the second bottle -4 hours before surgery.  ?  Purchase the CHG soap (1 bottle) and use TWICE            1.  Half a bottle after showering the night before surgery.            2.  The rest of the bottle after showering again the morning of surgery.   ?  Purchase CHEWABLE bariatric vitamins (Bariatric Choice)     Begin taking the weekend AFTER your surgery.  While in the Hospital  ?  Aim to drink 2-4 ounces of water per hour.  ?  Use caution with cold beverages as they can cause stomach cramping.  ? If you order broth it may be served at room temperature because protein powder is added to it. (The protein powder will coagulate if the broth is hot).  ?  Log your fluid intake on the “My Fluid Intake” sheet provided by your nurse to track number of medicine cup or ounce of fluid drank for the day.  While at home  ?  Take pain meds as instructed- wean off Oxycodone to liquid or dissolve packs Tylenol.  ?  Follow the diet as prescribed (stage 1 clear liquids for 3 days, stages 2-4 for 1 week each).  ?  WALK around your home at least 1 x per hour to prevent blood clots and to promote healing.  ?  Look out for signs of an infection, call the surgeons office if there is a concern.  ?  If you are constipated, do not take any stool softeners until the weekend AFTER your surgery.  ?  Follow-up with the surgeon 1-2 weeks after surgery.  ?  Follow-up with the bariatrician and dietitian 2-6 weeks after surgery.

## 2024-11-07 ENCOUNTER — HOSPITAL ENCOUNTER (OUTPATIENT)
Dept: GENERAL RADIOLOGY | Facility: HOSPITAL | Age: 54
Discharge: HOME | End: 2024-11-07
Attending: INTERNAL MEDICINE
Payer: COMMERCIAL

## 2024-11-07 ENCOUNTER — OFFICE VISIT (OUTPATIENT)
Dept: PULMONOLOGY | Facility: CLINIC | Age: 54
End: 2024-11-07

## 2024-11-07 VITALS
HEIGHT: 59 IN | WEIGHT: 215.19 LBS | DIASTOLIC BLOOD PRESSURE: 83 MMHG | SYSTOLIC BLOOD PRESSURE: 132 MMHG | OXYGEN SATURATION: 98 % | BODY MASS INDEX: 43.38 KG/M2 | HEART RATE: 72 BPM

## 2024-11-07 DIAGNOSIS — R06.83 SNORING: Primary | ICD-10-CM

## 2024-11-07 DIAGNOSIS — Z01.811 ENCOUNTER FOR PREOPERATIVE PULMONARY EXAMINATION: ICD-10-CM

## 2024-11-07 PROCEDURE — 3008F BODY MASS INDEX DOCD: CPT | Performed by: INTERNAL MEDICINE

## 2024-11-07 PROCEDURE — 3075F SYST BP GE 130 - 139MM HG: CPT | Performed by: INTERNAL MEDICINE

## 2024-11-07 PROCEDURE — 99204 OFFICE O/P NEW MOD 45 MIN: CPT | Performed by: INTERNAL MEDICINE

## 2024-11-07 PROCEDURE — 3079F DIAST BP 80-89 MM HG: CPT | Performed by: INTERNAL MEDICINE

## 2024-11-07 PROCEDURE — 71046 X-RAY EXAM CHEST 2 VIEWS: CPT | Performed by: INTERNAL MEDICINE

## 2024-11-07 NOTE — PROGRESS NOTES
Initial Pulmonary Medicine Outpatient Consultation           Reason for Consultation and CC: pre-op pulmonary risk assessment      Referring Physician: Dr. Stephen and Dr. Moon       Used IPAD  during today's visit  HPI: I had the pleasure of seeing Parvin Vargas for a Pulmonary Medicine consultation on 24.  55 yo Czech speaking woman with hx of elevated BMI, ARLETTE being seen for preop pulmonary risk assessment for upcoming bariatric surgery.   Denies any respiratory symptoms. Denies shortness of breath, wheezing, coughing, chest discomfort, fevers, chills, or sweats   Not on inhalers, nebs, or supp 02.   Denies experiencing breathing issues with past surgeries.   Does snore but not on CPAP. Per chart, has a hx of ARLETTE.        REVIEW OF SYSTEMS:  Positives and negatives as stated in HPI. Remainder of 12 pt review of systems otherwise are negative.       PAST MEDICAL HISTORY:  Past Medical History:    Morbid obesity with BMI of 40.0-44.9, adult (HCC)    ARLETTE (obstructive sleep apnea)    Sleep apnea    Visual impairment    READERS        PAST SURGICAL HISTORY:  Past Surgical History:   Procedure Laterality Date    Cholecystectomy      Prior classical       Sinus surgery            PAST FAMILY HISTORY:  No family history on file.       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 No OP exudates. MMP 4  NECK/THROAT: no JVD. Trachea midline. No obvious thyromegaly  LUNG: clear b/l no wheezing, crackles. Chest symmetric with respiratory motion  HEART: regular rate and rhythm, no obvious murmers or gallops note  ABD: soft non tender. + bowel sounds. No organomegaly noted  EXT: no clubbing, cyanosis, or edema noted. Pulses intact grossly  NEURO/MUSCULOSKELETAL: no gross deficits  SKIN: warm, dry. No obvious lesions noted  LYMPH: no obvious LAD       IMAGES:  No chest imaging to review       LABS:  Lab Results   Component Value Date    WBC 5.5 09/26/2024    RBC 4.28 09/26/2024    HGB 12.5 09/26/2024    HCT 37.8 09/26/2024    MCV 88.3 09/26/2024    MCH 29.2 09/26/2024    MCHC 33.1 09/26/2024    MPV 7.6 04/02/2018     No results for input(s): \"GLU\", \"BUN\", \"CREATSERUM\", \"GFRAA\", \"GFRNAA\", \"EGFRCR\", \"CA\", \"ALB\", \"NA\", \"K\", \"CL\", \"CO2\", \"ALKPHO\", \"AST\", \"ALT\", \"BILT\", \"TP\" in the last 168 hours.       PFTS none   PSG none        ASSESSMENT/PLAN:  Pre-op pulmonary risk assessment for upcoming bariatric surgery  -Check PFT and CXR    Reported hx of ARLETTE but pt denies and not on PAP therapy. However, does snore and MMP 4  -Check split night sleep study  - Strongly advise absolutely NO driving when sleepy  - Recommend good sleep hygiene as discussed   - Avoid alcohol and caffeine before going to bed    RTC in 2 months.        Thank you for the opportunity to care for Parvin Vargas.    I spent a total of 45 minutes in direct contact with the patient and reviewing pertinent outside records on the day of the encounter.     SRI Brambila DO, MPH  Pulmonary and Critical Care Medicine  [facility]          [1]   Allergies  Allergen Reactions    Shrimp RASH and Tightness in Throat    Daypro [Oxaprozin] Runny nose   [2]   Current Outpatient Medications on File Prior to Visit   Medication Sig Dispense Refill     ergocalciferol 1.25 MG (40829 UT) Oral Cap Take 1 capsule (50,000 Units total) by mouth once a week for 12 doses. 12 capsule 5    Phentermine HCl 15 MG Oral Cap Take 1 capsule (15 mg total) by mouth every morning. 30 capsule 5    topiramate 25 MG Oral Tab Take 1 tablet (25 mg total) by mouth every evening. 30 tablet 2     No current facility-administered medications on file prior to visit.

## 2024-11-07 NOTE — PATIENT INSTRUCTIONS
Go to radiology at [facility] and have a Chest X Ray done.    Schedule a Pulmonary Function Test at [facility].    Schedule a Split Night Sleep study at the sleep lab  - Strongly advise absolutely NO driving when sleepy  - Recommend good sleep hygiene as discussed   - Avoid alcohol and caffeine before going to bed      Come back in 2 months.

## 2024-11-12 ENCOUNTER — HOSPITAL ENCOUNTER (OUTPATIENT)
Dept: RESPIRATORY THERAPY | Facility: HOSPITAL | Age: 54
Discharge: HOME | End: 2024-11-12
Attending: INTERNAL MEDICINE
Payer: COMMERCIAL

## 2024-11-12 DIAGNOSIS — Z01.811 ENCOUNTER FOR PREOPERATIVE PULMONARY EXAMINATION: ICD-10-CM

## 2024-11-12 PROCEDURE — 94010 BREATHING CAPACITY TEST: CPT | Performed by: INTERNAL MEDICINE

## 2024-11-12 PROCEDURE — 94729 DIFFUSING CAPACITY: CPT | Performed by: INTERNAL MEDICINE

## 2024-11-12 PROCEDURE — 94726 PLETHYSMOGRAPHY LUNG VOLUMES: CPT | Performed by: INTERNAL MEDICINE

## 2024-11-12 NOTE — PROCEDURES
[facility]  part of [facility]     Pulmonary Function Test     Parvin Vargas Patient Status:  Outpatient    1970 MRN [id number]   Date of Exam 24 PCP AUGUSTA WHITTINGTON MD           Spirometry   FEV1: 2.06 97%  FVC: 2.43 95%  FEV1/FVC: 0.85    Lung Volume   TLC: 3.61 84%  RV : 1.17 90%    Diffusion Capacity   DLCO: 21.35 119%    Flow Volume Loop       Impression   No evidence of obstructive defect seen.  Unable to comment on postbronchodilator response since not performed.  Normal lung volumes.  Normal diffusion capacity    Steven Willson DO  Pulmonary Critical Care Medicine  [facility]

## 2024-11-26 ENCOUNTER — OFFICE VISIT (OUTPATIENT)
Dept: SURGERY | Facility: CLINIC | Age: 54
End: 2024-11-26
Payer: COMMERCIAL

## 2024-11-26 VITALS
WEIGHT: 213 LBS | DIASTOLIC BLOOD PRESSURE: 80 MMHG | SYSTOLIC BLOOD PRESSURE: 120 MMHG | HEART RATE: 89 BPM | BODY MASS INDEX: 42.94 KG/M2 | OXYGEN SATURATION: 99 % | HEIGHT: 59 IN

## 2024-11-26 NOTE — PROGRESS NOTES
[facility], [place]  [address]  [facility] [address]  Dept: [phone]    11/26/2024    BARIATRIC EXISTING PATIENT/FOLLOW UP    HPI:  Parvin Vargas is a 54 year old-year old female who presents for potential bariatric surgery.  Since I last saw her she is lost another 3 pounds.  She is interested in sleeve gastrectomy and today we had a long discussion about the operation itself the anatomic changes made, the anticipated recovery and a detailed discussion of potential risks.  All of her conversations were held in [place].  Her workup is almost complete.  She has a sleep study coming up at the end of December then follow-up with the pulmonologist in mid January.  The patient is concerned because her health insurance is through her  and a changes in January to [facility].  We discussed checking with human resources for the upcoming insurance policy to see if it has bariatric benefits.    PCP Dr Man Noble Bryon, phentermine, topirimate  Bar renee Stone  Cards Serarenee rice  Pulm Kosta f/u 1/16/25, sleep study  Psych Che, ok     PMH Denies, possible sleep apnea  PSH C section x2, lap jayme, nasal surgery, left foot surgery  Meds Phentermine, topirimate    PHYSICAL EXAM:    Vital Signs:  /80   Pulse 89   Ht 4' 11\" (1.499 m)   Wt 213 lb (96.6 kg)   LMP 12/01/2023   SpO2 99%   BMI 43.02 kg/m²   BMI:  Body mass index is 43.02 kg/m².  Examination is unchanged she moves easily breathing is unlabored abdomen soft    ASSESSMENT:  Morbid obesity interested in sleeve gastrectomy, follow-up with me in January after she finishes with the pulmonologist.    Plan: Follow-up January    Jordan Moon MD  11/26/2024

## 2024-12-03 ENCOUNTER — OFFICE VISIT (OUTPATIENT)
Dept: SLEEP CENTER | Age: 54
End: 2024-12-03
Attending: INTERNAL MEDICINE
Payer: COMMERCIAL

## 2024-12-03 DIAGNOSIS — Z01.811 ENCOUNTER FOR PREOPERATIVE PULMONARY EXAMINATION: ICD-10-CM

## 2024-12-03 DIAGNOSIS — R06.83 SNORING: ICD-10-CM

## 2024-12-03 PROCEDURE — 95810 POLYSOM 6/> YRS 4/> PARAM: CPT

## 2024-12-12 ENCOUNTER — OFFICE VISIT (OUTPATIENT)
Dept: PULMONOLOGY | Facility: CLINIC | Age: 54
End: 2024-12-12

## 2024-12-12 VITALS
DIASTOLIC BLOOD PRESSURE: 80 MMHG | WEIGHT: 218.19 LBS | SYSTOLIC BLOOD PRESSURE: 137 MMHG | BODY MASS INDEX: 42.84 KG/M2 | RESPIRATION RATE: 16 BRPM | HEART RATE: 82 BPM | HEIGHT: 59.75 IN | OXYGEN SATURATION: 98 %

## 2024-12-12 DIAGNOSIS — G47.33 OSA (OBSTRUCTIVE SLEEP APNEA): Primary | ICD-10-CM

## 2024-12-12 PROCEDURE — 3075F SYST BP GE 130 - 139MM HG: CPT | Performed by: PHYSICIAN ASSISTANT

## 2024-12-12 PROCEDURE — 99213 OFFICE O/P EST LOW 20 MIN: CPT | Performed by: PHYSICIAN ASSISTANT

## 2024-12-12 PROCEDURE — 3079F DIAST BP 80-89 MM HG: CPT | Performed by: PHYSICIAN ASSISTANT

## 2024-12-12 PROCEDURE — 3008F BODY MASS INDEX DOCD: CPT | Performed by: PHYSICIAN ASSISTANT

## 2024-12-12 NOTE — PROGRESS NOTES
Pulmonary Progress Note    History of Present Illness:  Parvin Vargas is a 54 year old female presenting to pulmonary clinic today for follow up. [employer] medical translation service was utilized for this service. Seen by Dr. Brambila 11/7/2024 for pre-op risk assessment for bariatric surgery. She completed PFTs and chest x-ray which were both normal. Sleep study demonstrated moderate ARLETTE which becomes severe in supine position and REM AHI 27.9, REM AHI 63.9, supine AHI 44.1). Patient admits to snoring and infrequent nocturia. Denies excessive daytime sleepiness, drowsy driving, and unrefreshing sleep. No tobacco history or known history of lung diseases or venous thromboembolism. No issues with anesthesia in the past.     Social History: No tobacco, no alcohol    Medications: has a current medication list which includes the following prescription(s): ergocalciferol, phentermine hcl, and topiramate.    Review of Systems:   Constitutional: +Intentional weight loss of 23 pounds in 6 months. No fever or chills.   Cardio: No chest pain.  Respiratory: No shortness of breath or cough.  GI: No acid reflux.  Extremities: No lower extremity swelling or pain.   Neurologic: No headache.  Psych: No depression.     Physical Exam:  /80 (BP Location: Left arm)   Pulse 82   Resp 16   Ht 4' 11.75\" (1.518 m)   Wt 218 lb 3.2 oz (99 kg)   LMP 12/01/2023   SpO2 98%   BMI 42.97 kg/m²    Constitutional: Obese. No acute distress.   HEENT: Head NC/AT. PEERL. Crowded oropharynx. Mallampati class 4.  Cardio: Regular rate and rhythm. Normal S1 and S2. No murmurs.   Respiratory: Thorax symmetrical with no labored breathing. Clear to ausculation bilaterally with symmetrical breath sounds. No wheezing, rhonchi, or crackles.   Extremities: No clubbing or cyanosis. No LE edema. No calf tenderness.  Neurologic: A&Ox3. No gross motor deficits.  Skin: Warm, dry.  Psych: Calm, cooperative. Pleasant affect.    Results:  -PFTs  11/2024: Normal  -CXR 11/2024: Clear  -PSG 12/2024: AHI 27.9, REM AHI 63.9, supine AHI 44.1    Assessment/Plan:  ARLETTE  Moderately severe ARLETTE. Discussed risks of untreated ARLETTE including increased risk of cardiovascular and cerebrovascular events.  Plan:  -CPAP titration study  -Weight loss  -Avoid alcohol and sedating drugs  -Never drive if sleepy  -Follow up 1-2 months after CPAP initiation    Preoperative pulmonary evaluation  PFTs and CXR normal. Needs CPAP initiation.  Plan:  -CPAP titration study followed by CPAP initiation  -Follow up with Dr. Brambila 1-2 months after CPAP initiation    Moo Castañeda PA-C  Pulmonary Medicine  12/12/2024    no

## 2024-12-16 ENCOUNTER — OFFICE VISIT (OUTPATIENT)
Dept: SURGERY | Facility: CLINIC | Age: 54
End: 2024-12-16
Payer: COMMERCIAL

## 2024-12-16 VITALS
HEIGHT: 59 IN | WEIGHT: 215.31 LBS | HEART RATE: 83 BPM | SYSTOLIC BLOOD PRESSURE: 116 MMHG | OXYGEN SATURATION: 100 % | DIASTOLIC BLOOD PRESSURE: 70 MMHG | BODY MASS INDEX: 43.4 KG/M2

## 2024-12-16 DIAGNOSIS — R73.03 PRE-DIABETES: Primary | ICD-10-CM

## 2024-12-16 DIAGNOSIS — Z51.81 ENCOUNTER FOR THERAPEUTIC DRUG MONITORING: ICD-10-CM

## 2024-12-16 DIAGNOSIS — G47.33 OSA (OBSTRUCTIVE SLEEP APNEA): ICD-10-CM

## 2024-12-16 DIAGNOSIS — E66.01 OBESITY, CLASS III, BMI 40-49.9 (MORBID OBESITY) (HCC): ICD-10-CM

## 2024-12-16 PROCEDURE — 3078F DIAST BP <80 MM HG: CPT | Performed by: INTERNAL MEDICINE

## 2024-12-16 PROCEDURE — 3008F BODY MASS INDEX DOCD: CPT | Performed by: INTERNAL MEDICINE

## 2024-12-16 PROCEDURE — 99214 OFFICE O/P EST MOD 30 MIN: CPT | Performed by: INTERNAL MEDICINE

## 2024-12-16 PROCEDURE — 3074F SYST BP LT 130 MM HG: CPT | Performed by: INTERNAL MEDICINE

## 2024-12-16 RX ORDER — ERGOCALCIFEROL 1.25 MG/1
50000 CAPSULE, LIQUID FILLED ORAL WEEKLY
COMMUNITY
Start: 2024-10-05

## 2024-12-16 NOTE — PROGRESS NOTES
[facility] / [facility], [facility], [place]  [address] [facility] [address]  [facility] [address]  Dept: [phone]       Patient:  Parvin Vargas  :      1970  MRN:      [id number]    Chief Complaint:    Chief Complaint   Patient presents with    Follow - Up    Weight Management       SUBJECTIVE     History of Present Illness:  Parvin is being seen today for a follow-up for pre surgical weight loss    Past Medical History:   Past Medical History:    Morbid obesity with BMI of 40.0-44.9, adult (HCC)    ARLETTE (obstructive sleep apnea)    Sleep apnea    Visual impairment    READERS        Comorbidities:  SOB/LOYD-Improvement?  yes, Back pain-Improvement?  yes and Sleep apnea-Improvement?  yes    OBJECTIVE     Vitals: /70   Pulse 83   Ht 4' 11\" (1.499 m)   Wt 215 lb 4.8 oz (97.7 kg)   LMP 2023   SpO2 100%   BMI 43.49 kg/m²     Initial weight loss: -02   Total weight loss: +16   Start weight: 199    Wt Readings from Last 3 Encounters:   24 215 lb 4.8 oz (97.7 kg)   24 218 lb 3.2 oz (99 kg)   24 213 lb (96.6 kg)       Patient Medications:    Current Outpatient Medications   Medication Sig Dispense Refill    ergocalciferol 1.25 MG (76238 UT) Oral Cap Take 1 capsule (50,000 Units total) by mouth once a week.      Phentermine HCl 15 MG Oral Cap Take 1 capsule (15 mg total) by mouth every morning. 30 capsule 5    topiramate 25 MG Oral Tab Take 1 tablet (25 mg total) by mouth every evening. 30 tablet 2     Allergies:  Shrimp and Daypro [oxaprozin]     Social History:    Social History     Socioeconomic History    Marital status:      Spouse name: Not on file    Number of children: Not on file    Years of education: Not on file    Highest education level: Not on file   Occupational History    Not on file   Tobacco Use    Smoking status: Never    Smokeless tobacco: Never   Vaping Use    Vaping status: Never Used   Substance and Sexual Activity     Alcohol use: Never    Drug use: Never    Sexual activity: Not on file   Other Topics Concern    Not on file   Social History Narrative    Not on file     Social Drivers of Health     Financial Resource Strain: Not on file   Food Insecurity: Not on file   Transportation Needs: Not on file   Physical Activity: Not on file   Stress: Not on file   Social Connections: Not on file   Housing Stability: Not on file     Surgical History:    Past Surgical History:   Procedure Laterality Date    Cholecystectomy  1996    Foot surgery Left 2024    Prior classical       Sinus surgery         Family History:  No family history on file.    Food Journal  Reviewed and Discussed:       Patient has a Food Journal?: yes   Patient is reading nutrition labels?  yes  Average Caloric Intake:     Average CHO Intake: 120  Is patient exercising? yes  Type of exercise? walking    Eating Habits  Patient states the following:  Eats 3 meal(s) per day  Length of time it takes to consume a meal:  20  # of snacks per day: 1 Type of snacks:  fruit  Amount of soda consumption per day:  regular  Amount of water (in ounces) per day:  64  Drinking between meals only:  yes  Toughest challenge:  soda    Nutritional Goals  Limit carbohydrates to 100 gms per day, Eat 100-200 calories within 1 hour of waking  and Eat 3-4 cups of fresh fruits or vegetables daily    Behavior Modifications Reviewed and Discussed  Plan meals in advance, Read nutrition labels, Drink 64 oz of water per day, Maintain a daily food journal, No drinking 30 minutes before or after meals, Utlize portion control strategies to reduce calorie intake, Identify triggers for eating and manage cues and Eat slowly and take 20 to 30 minutes to complete each meal    Exercise Goals Reviewed and Discussed    Keep walking    ROS:    Constitutional: positive for fatigue  Respiratory: negative  Cardiovascular: negative  Gastrointestinal: negative  Musculoskeletal:negative  Neurological:  negative  Behavioral/Psych: negative  Endocrine: negative  All other systems were reviewed and are negative    Physical Exam:   General appearance: alert, appears stated age, cooperative and moderately obese  Head: Normocephalic, without obvious abnormality, atraumatic  Eyes: conjunctivae/corneas clear. PERRL, EOM's intact. Fundi benign.  Back: symmetric, no curvature. ROM normal. No CVA tenderness.  Lungs: clear to auscultation bilaterally  Heart: S1, S2 normal, no murmur, click, rub or gallop, regular rate and rhythm  Abdomen: soft, non-tender; bowel sounds normal; no masses,  no organomegaly  Extremities: extremities normal, atraumatic, no cyanosis or edema  Pulses: 2+ and symmetric  Skin: Skin color, texture, turgor normal. No rashes or lesions    ASSESSMENT       Encounter Diagnosis(ses):   Encounter Diagnoses   Name Primary?    Pre-diabetes Yes    ARLETTE (obstructive sleep apnea)     Encounter for therapeutic drug monitoring     Obesity, Class III, BMI 40-49.9 (morbid obesity) ([facility])        PLAN     Discussed with patient the risks and benefits of RYGBP and VSG. The patient is interested in bariatric surgery and has begun our presurgical process.       Fatigue: should improve with weight loss    OBSTRUCTIVE SLEEP APNEA: Given the patient's history suggestive of obstructive sleep apnea as outlined above, consideration for obtaining a sleep study may be warranted.  Further consideration for obtaining the sleep study will be discussed with the patient's PCP.    Goals for next month:  1. Keep a food log.  2. Drink 48-64 ounces of non-caloric beverages per day. No fruit juices or regular soda.  3. Increase activity-upper body exercises, walk 10 minutes per day.  4. Increase fruit and vegetable servings to 5-6 per day.      Attended seminar    Met with Dr Moon  Met with RD  Met with Psych  Met with cardiology  Sleep study done. Has part two scheduled    PHENTERMINE: refill at 15 mg  Stop two weeks before  surgery    Topiramate: 25 mg every evening for caravings    Patient's vitamin D level was low and will require Drisdol 50,000 I.U. Weekly for 12 weeks.        Diagnoses and all orders for this visit:    Pre-diabetes    ARLETTE (obstructive sleep apnea)    Encounter for therapeutic drug monitoring    Obesity, Class III, BMI 40-49.9 (morbid obesity) ([facility])          Aba Slater MD

## 2024-12-17 ENCOUNTER — TELEPHONE (OUTPATIENT)
Dept: PULMONOLOGY | Facility: CLINIC | Age: 54
End: 2024-12-17

## 2024-12-17 DIAGNOSIS — G47.33 OSA (OBSTRUCTIVE SLEEP APNEA): Primary | ICD-10-CM

## 2024-12-17 NOTE — TELEPHONE ENCOUNTER
Order placed for APAP.     RN: Please send order to E and call the patient with  to let her know we can order APAP instead and she can cancel CPAP titration study if she has it scheduled (I do not see it). She should follow up with me or Dr. Brambila 2-3 months after starting CPAP. Thank you so much!    Dr. Brambila: TONYI, APAP ordered.   Normal

## 2024-12-17 NOTE — TELEPHONE ENCOUNTER
Pt is undergoing pulmonary risk assessment for bariatric surgery.   CXR and PFTS are unremarkable.   Sleep study is consistent with sleep apnea.   Pt hasn't completed the CPAP titration study but should be able to be treated with autoPAP (APAP) 5-16 cmH20 in the meantime.   The pt should be instructed to take her machine (once she receives it) to the surgery center and use it afterwards.   If she doesn't have the machine at the time of the surgery, then anesthesia and her providers should start her on APAP 5-16 cmH20 in the post op time and with sleeping/naps.   Overall, Based on the pt's risk factors and surgical site/type/duration and anesthesia factors, pt is at low to intermediate risk for experiencing any post op pulmonary complications including hypoxemia, respiratory failure, pneumonia, atelectasis (from splinting due to pain), volume overload, pulmonary embolism, bronchospasm. In order to minimize these risks, she may benefit from a short interval of NIV (as directed above). Additionally, recommend she receive adequate oxygenation and ventilation during preop, intraop, post op period. Post op frequent incentive spirometer, pain control, DVT proph, PT/OT, PRN Duoneb nebulizers.     Please reach out to our office with questions/concerns.     SRI Brambila DO, MPH  Pulmonary Critical Care Medicine  [facility] Pulmonary and Critical Care Medicine  Office number: [phone]

## 2024-12-17 NOTE — TELEPHONE ENCOUNTER
PAP orders sent to [facility]. Call, patient with the language line services, ID # [id number], Philip. Informed patient her CPAP machine was ordered. Patient said her insurance is changing on January 1st and would like to wait for the order to be placed with her new insurance. Advised patient to call on January 2nd with the new insurance information. Patient understanding.  Called E, spoke to Lucita, informed her of above. Advised to send order to E again at that time.    TREVON Cortés.

## 2024-12-24 ENCOUNTER — OFFICE VISIT (OUTPATIENT)
Dept: SURGERY | Facility: CLINIC | Age: 54
End: 2024-12-24
Payer: COMMERCIAL

## 2024-12-24 VITALS
HEIGHT: 59 IN | HEART RATE: 75 BPM | DIASTOLIC BLOOD PRESSURE: 80 MMHG | OXYGEN SATURATION: 99 % | SYSTOLIC BLOOD PRESSURE: 112 MMHG | WEIGHT: 219 LBS | BODY MASS INDEX: 44.15 KG/M2

## 2024-12-24 NOTE — PROGRESS NOTES
[facility], [facility]  [address]  [place] [address]  Dept: [phone]    12/24/2024    BARIATRIC EXISTING PATIENT/FOLLOW UP    HPI:  Parvin Vargas is a 54 year old-year old female who presents for continued follow-up prior to bariatric surgery.  She is interested in sleeve gastrectomy but unfortunately gained 6 pounds.  Since I last saw her she has been cleared by the pulmonologist and she is still uncertain as to whether or not her new insurance will cover surgery in the new year.  Nonetheless we discussed the operation of sleeve gastrectomy going over port placement the actual anatomic changes made the anticipated recovery and potential risks.  Again I stressed the importance of lifestyle modification.  We also talked about the fact she gained a little bit of weight since I last saw her..  All conversations were held in Maori    PCP Dr Man Noble Bryon, phentermine, topirimate  Bar Bertha, ok  Cards Serajian, ok  Pulm Kosta ok  Psych Sarhaddi, ok     PMH ARLETTE, pulm rec APAP 5-16cm  PSH C section x2, lap jayme, nasal surgery, left foot surgery  Meds Phentermine, topirimate, vit D    PHYSICAL EXAM:    Vital Signs:  /80   Pulse 75   Ht 4' 11\" (1.499 m)   Wt 219 lb (99.3 kg)   LMP 12/01/2023   SpO2 99%   BMI 44.23 kg/m²   BMI:  Body mass index is 44.23 kg/m².  Examination is unchanged, patient moves easily and is friendly.  Breathing is unlabored pulse normal abdomen soft    ASSESSMENT:  Morbid obesity interested in sleeve gastrectomy did have some weight gain but otherwise is cleared by all the other healthcare providers.  I stressed the importance of losing this weight follow-up with me in 1 month needs to lose 6 pounds and if her insurance will approve we can schedule her likely in February    Plan: Follow-up 1 month    Jordan Moon MD  12/24/2024

## 2025-01-02 ENCOUNTER — TELEPHONE (OUTPATIENT)
Dept: PULMONOLOGY | Facility: CLINIC | Age: 55
End: 2025-01-02

## 2025-01-02 NOTE — TELEPHONE ENCOUNTER
Jerson, [facility] calling to request office visit notes from before 12/17 for diagnostic sleep study. Please fax [phone],thanks.

## 2025-01-20 ENCOUNTER — TELEPHONE (OUTPATIENT)
Dept: SURGERY | Facility: CLINIC | Age: 55
End: 2025-01-20

## 2025-01-21 ENCOUNTER — OFFICE VISIT (OUTPATIENT)
Dept: SURGERY | Facility: CLINIC | Age: 55
End: 2025-01-21
Payer: COMMERCIAL

## 2025-01-21 VITALS
SYSTOLIC BLOOD PRESSURE: 118 MMHG | HEIGHT: 59 IN | OXYGEN SATURATION: 99 % | HEART RATE: 70 BPM | BODY MASS INDEX: 42.33 KG/M2 | WEIGHT: 210 LBS | DIASTOLIC BLOOD PRESSURE: 80 MMHG

## 2025-01-21 NOTE — PROGRESS NOTES
[facility], [facility], [place]  [address]  [facility] [address]  Dept: [phone]    1/21/2025    BARIATRIC EXISTING PATIENT/FOLLOW UP    HPI:  Parvin Vargas is a 54 year old-year old female who presents for continued follow-up prior to bariatric surgery.  She is interested in sleeve gastrectomy and she is lost 9 pounds since I last saw her.  In general she is doing well and today we reviewed the preoperative workup.  We went over the operation itself in detail including port placement, anatomic changes made, anticipated recovery and potential risks.  We reviewed her medications as well as the pre and postoperative diet.  All conversations were held in Belgian.    PCP Dr Man Noble Bryon, phentermine, topirimate  Bar Bertha, ok  Cards Serajian, ok  Pulm Kosta ok  Psych Sarhaddi, ok     PMH     ARLETTE, pulm rec APAP 5-16cm  PSH C section x2, lap jayme, nasal surgery, left foot surgery  Meds Phentermine, topirimate, vit D    PHYSICAL EXAM:    Vital Signs:  /80   Pulse 70   Ht 4' 11\" (1.499 m)   Wt 210 lb (95.3 kg)   LMP 12/01/2023   SpO2 99%   BMI 42.41 kg/m²   BMI:  Body mass index is 42.41 kg/m².  Patient looks good she moves easily breathing is unlabored pulse normal abdomen soft.    ASSESSMENT:  Morbid obesity plan for sleeve gastrectomy on February 17.  She will begin a liquid diet 2 weeks prior to this and stop the phentermine and topiramate at that time.    Plan: Sleeve gastrectomy February 17    Jordan Moon MD  1/21/2025

## 2025-01-30 ENCOUNTER — TELEPHONE (OUTPATIENT)
Dept: SURGERY | Facility: CLINIC | Age: 55
End: 2025-01-30

## 2025-01-30 NOTE — TELEPHONE ENCOUNTER
Patient called back stating that her doctor told her that she did not need the pre op liquid appointment.  gt

## (undated) DEVICE — SUT VCRL 3-0 27IN FS-1 ABSRB UD L24MM 3/8 CIR

## (undated) DEVICE — COTTON UNDERCAST PADDING,REGULAR FINISH: Brand: WEBRIL

## (undated) DEVICE — 3M™ STERI-STRIP™ REINFORCED ADHESIVE SKIN CLOSURES, R1547, 1/2 IN X 4 IN (12 MM X 100 MM), 6 STRIPS/ENVELOPE: Brand: 3M™ STERI-STRIP™

## (undated) DEVICE — INTENDED TO BE USED TO OCCLUDE, RETRACT AND IDENTIFY ARTERIES, VEINS, TENDONS AND NERVES IN SURGICAL PROCEDURES: Brand: STERION®  VESSEL LOOP

## (undated) DEVICE — BANDAGE,GAUZE,BULKEE II,4.5"X4.1YD,STRL: Brand: MEDLINE

## (undated) DEVICE — SUT ETHLN 3-0 30IN FS-1 NABSRB BLK 24MM 3/8 C

## (undated) DEVICE — ADHESIVE LIQ 2/3ML VI MASTISOL

## (undated) DEVICE — OCCLUSIVE GAUZE STRIP OVERWRAP,3% BISMUTH TRIBROMOPHENATE IN PETROLATUM BLEND: Brand: XEROFORM

## (undated) DEVICE — DISPOSABLE TOURNIQUET CUFF SINGLE BLADDER, DUAL PORT AND QUICK CONNECT CONNECTOR: Brand: COLOR CUFF

## (undated) DEVICE — SOLUTION IRRIG 1000ML 0.9% NACL USP BTL

## (undated) DEVICE — STANDARD HYPODERMIC NEEDLE,POLYPROPYLENE HUB: Brand: MONOJECT

## (undated) DEVICE — GAMMEX® PI HYBRID SIZE 8.5, STERILE POWDER-FREE SURGICAL GLOVE, POLYISOPRENE AND NEOPRENE BLEND: Brand: GAMMEX

## (undated) DEVICE — 12 ML SYRINGE LUER-LOCK TIP: Brand: MONOJECT

## (undated) DEVICE — Device

## (undated) DEVICE — PACK CDS LOWER EXTREMITY

## (undated) DEVICE — INTENDED FOR TISSUE SEPARATION, AND OTHER PROCEDURES THAT REQUIRE A SHARP SURGICAL BLADE TO PUNCTURE OR CUT.: Brand: BARD-PARKER ® STAINLESS STEEL BLADES

## (undated) NOTE — Clinical Note
She attended the seminar in the past.  Congolese speaking I did explain she should attend a new seminar Ill put in a referral for the dietitican and Dr treadwell  Thanks

## (undated) NOTE — Clinical Note
Hi, this patient come in to see me for f/u today. You saw her initially last month for bariatric pre-op eval. She has moderately severe ARLETTE so I ordered CPAP titration. Let me know if there's anything different you'd recommend for her.  Moo

## (undated) NOTE — Clinical Note
She is cleared for bariatric surgery.  Just waiting for your blessings.  After surgery she will lose a lot of weight. Not sure if you want her to get a cpap still or not.   Aba

## (undated) NOTE — ED AVS SNAPSHOT
Germaine Crowe   MRN: [id number]    Department:  [facility]   Date of Visit:  4/2/2018           Disclosure     Insurance plans vary and the physician(s) referred by the ER may not be covered by your plan.  Please conta CARE PHYSICIAN AT ONCE OR RETURN IMMEDIATELY TO THE EMERGENCY DEPARTMENT. If you have been prescribed any medication(s), please fill your prescription right away and begin taking the medication(s) as directed.   If you believe that any of the medications